# Patient Record
Sex: FEMALE | Race: WHITE | NOT HISPANIC OR LATINO | Employment: OTHER | URBAN - METROPOLITAN AREA
[De-identification: names, ages, dates, MRNs, and addresses within clinical notes are randomized per-mention and may not be internally consistent; named-entity substitution may affect disease eponyms.]

---

## 2020-07-08 ENCOUNTER — OFFICE VISIT - HEALTHEAST (OUTPATIENT)
Dept: INTERNAL MEDICINE | Facility: CLINIC | Age: 85
End: 2020-07-08

## 2020-07-08 DIAGNOSIS — M54.50 CHRONIC BILATERAL LOW BACK PAIN WITHOUT SCIATICA: ICD-10-CM

## 2020-07-08 DIAGNOSIS — G89.29 CHRONIC BILATERAL LOW BACK PAIN WITHOUT SCIATICA: ICD-10-CM

## 2020-07-08 DIAGNOSIS — M81.0 AGE-RELATED OSTEOPOROSIS WITHOUT CURRENT PATHOLOGICAL FRACTURE: ICD-10-CM

## 2020-07-08 DIAGNOSIS — Z76.89 ENCOUNTER TO ESTABLISH CARE: ICD-10-CM

## 2020-07-08 RX ORDER — NIACIN 250 MG
250 TABLET ORAL
Status: SHIPPED | COMMUNITY
Start: 2020-07-08

## 2020-07-08 RX ORDER — POLYETHYLENE GLYCOL 3350 17 G/17G
17 POWDER, FOR SOLUTION ORAL DAILY PRN
Status: SHIPPED | COMMUNITY
Start: 2020-07-08

## 2020-07-08 RX ORDER — ASCORBIC ACID 500 MG
1000 TABLET ORAL DAILY
Status: SHIPPED | COMMUNITY
Start: 2020-07-08

## 2020-07-08 RX ORDER — MECLIZINE HYDROCHLORIDE 25 MG/1
25 TABLET ORAL DAILY PRN
Status: SHIPPED | COMMUNITY
Start: 2020-07-08

## 2020-07-08 ASSESSMENT — MIFFLIN-ST. JEOR: SCORE: 921.9

## 2021-01-27 ENCOUNTER — AMBULATORY - HEALTHEAST (OUTPATIENT)
Dept: LAB | Facility: CLINIC | Age: 86
End: 2021-01-27

## 2021-01-27 ENCOUNTER — OFFICE VISIT - HEALTHEAST (OUTPATIENT)
Dept: INTERNAL MEDICINE | Facility: CLINIC | Age: 86
End: 2021-01-27

## 2021-01-27 DIAGNOSIS — G89.29 CHRONIC BILATERAL LOW BACK PAIN WITHOUT SCIATICA: ICD-10-CM

## 2021-01-27 DIAGNOSIS — M54.50 CHRONIC BILATERAL LOW BACK PAIN WITHOUT SCIATICA: ICD-10-CM

## 2021-01-27 DIAGNOSIS — H61.20 IMPACTED CERUMEN, UNSPECIFIED LATERALITY: ICD-10-CM

## 2021-01-27 DIAGNOSIS — Z00.00 ROUTINE GENERAL MEDICAL EXAMINATION AT A HEALTH CARE FACILITY: ICD-10-CM

## 2021-01-27 DIAGNOSIS — Z13.220 ENCOUNTER FOR SCREENING FOR LIPOID DISORDERS: ICD-10-CM

## 2021-01-27 DIAGNOSIS — M81.0 AGE-RELATED OSTEOPOROSIS WITHOUT CURRENT PATHOLOGICAL FRACTURE: ICD-10-CM

## 2021-01-27 DIAGNOSIS — T14.8XXA SKIN ABRASION: ICD-10-CM

## 2021-01-27 LAB
ALBUMIN SERPL-MCNC: 3.7 G/DL (ref 3.5–5)
ALP SERPL-CCNC: 108 U/L (ref 45–120)
ALT SERPL W P-5'-P-CCNC: 19 U/L (ref 0–45)
ANION GAP SERPL CALCULATED.3IONS-SCNC: 11 MMOL/L (ref 5–18)
AST SERPL W P-5'-P-CCNC: 25 U/L (ref 0–40)
BASOPHILS # BLD AUTO: 0.1 THOU/UL (ref 0–0.2)
BASOPHILS NFR BLD AUTO: 1 % (ref 0–2)
BILIRUB SERPL-MCNC: 0.6 MG/DL (ref 0–1)
BUN SERPL-MCNC: 16 MG/DL (ref 8–28)
CALCIUM SERPL-MCNC: 9.7 MG/DL (ref 8.5–10.5)
CHLORIDE BLD-SCNC: 102 MMOL/L (ref 98–107)
CHOLEST SERPL-MCNC: 290 MG/DL
CO2 SERPL-SCNC: 27 MMOL/L (ref 22–31)
CREAT SERPL-MCNC: 0.94 MG/DL (ref 0.6–1.1)
EOSINOPHIL # BLD AUTO: 0 THOU/UL (ref 0–0.4)
EOSINOPHIL NFR BLD AUTO: 1 % (ref 0–6)
ERYTHROCYTE [DISTWIDTH] IN BLOOD BY AUTOMATED COUNT: 13 % (ref 11–14.5)
FASTING STATUS PATIENT QL REPORTED: YES
GFR SERPL CREATININE-BSD FRML MDRD: 56 ML/MIN/1.73M2
GLUCOSE BLD-MCNC: 112 MG/DL (ref 70–125)
HCT VFR BLD AUTO: 43.4 % (ref 35–47)
HDLC SERPL-MCNC: 88 MG/DL
HGB BLD-MCNC: 14.5 G/DL (ref 12–16)
IMM GRANULOCYTES # BLD: 0 THOU/UL
IMM GRANULOCYTES NFR BLD: 0 %
LDLC SERPL CALC-MCNC: 188 MG/DL
LYMPHOCYTES # BLD AUTO: 1.7 THOU/UL (ref 0.8–4.4)
LYMPHOCYTES NFR BLD AUTO: 29 % (ref 20–40)
MCH RBC QN AUTO: 31.5 PG (ref 27–34)
MCHC RBC AUTO-ENTMCNC: 33.4 G/DL (ref 32–36)
MCV RBC AUTO: 94 FL (ref 80–100)
MONOCYTES # BLD AUTO: 0.5 THOU/UL (ref 0–0.9)
MONOCYTES NFR BLD AUTO: 9 % (ref 2–10)
NEUTROPHILS # BLD AUTO: 3.5 THOU/UL (ref 2–7.7)
NEUTROPHILS NFR BLD AUTO: 61 % (ref 50–70)
PLATELET # BLD AUTO: 287 THOU/UL (ref 140–440)
PMV BLD AUTO: 10.3 FL (ref 8.5–12.5)
POTASSIUM BLD-SCNC: 4.3 MMOL/L (ref 3.5–5)
PROT SERPL-MCNC: 7 G/DL (ref 6–8)
RBC # BLD AUTO: 4.6 MILL/UL (ref 3.8–5.4)
SODIUM SERPL-SCNC: 140 MMOL/L (ref 136–145)
TRIGL SERPL-MCNC: 72 MG/DL
TSH SERPL DL<=0.005 MIU/L-ACNC: 1.43 UIU/ML (ref 0.3–5)
WBC: 5.8 THOU/UL (ref 4–11)

## 2021-01-27 ASSESSMENT — MIFFLIN-ST. JEOR: SCORE: 926.43

## 2021-01-29 ENCOUNTER — COMMUNICATION - HEALTHEAST (OUTPATIENT)
Dept: INTERNAL MEDICINE | Facility: CLINIC | Age: 86
End: 2021-01-29

## 2021-02-16 ENCOUNTER — AMBULATORY - HEALTHEAST (OUTPATIENT)
Dept: NURSING | Facility: CLINIC | Age: 86
End: 2021-02-16

## 2021-03-09 ENCOUNTER — AMBULATORY - HEALTHEAST (OUTPATIENT)
Dept: NURSING | Facility: CLINIC | Age: 86
End: 2021-03-09

## 2021-06-04 VITALS
RESPIRATION RATE: 16 BRPM | HEART RATE: 82 BPM | WEIGHT: 117 LBS | TEMPERATURE: 97.9 F | BODY MASS INDEX: 20.73 KG/M2 | SYSTOLIC BLOOD PRESSURE: 138 MMHG | HEIGHT: 63 IN | OXYGEN SATURATION: 93 % | DIASTOLIC BLOOD PRESSURE: 86 MMHG

## 2021-06-05 VITALS
TEMPERATURE: 98.2 F | DIASTOLIC BLOOD PRESSURE: 78 MMHG | SYSTOLIC BLOOD PRESSURE: 138 MMHG | RESPIRATION RATE: 20 BRPM | HEIGHT: 63 IN | BODY MASS INDEX: 20.91 KG/M2 | OXYGEN SATURATION: 94 % | WEIGHT: 118 LBS | HEART RATE: 101 BPM

## 2021-06-09 NOTE — PROGRESS NOTES
Internal Medicine Office Visit  [facility]  Patient Name: Ambar Dutton  Patient Age: 87 y.o.  YOB: 1932  MRN: [id number]    Date of Visit: 2020  Reason for Office Visit:   Chief Complaint   Patient presents with     Establish Care           Assessment / Plan / Medical Decision Makin. Encounter to establish care  2. Age-related osteoporosis without current pathological fracture  - DXA Bone Density Scan; Future  - DXA Bone Density Scan  Discussion was had with patient about weightbearing physical activity.  She feels that her balance is good she is getting adequate physical activity discussed with patient also that if her bone density indicates osteoporosis will recommend referral to the osteoporosis clinic for further discussion of treatment options given patient's age and health status    3. Chronic bilateral low back pain without sciatica  Recommend utilization over-the-counter Tylenol, patient certainly may trial some heat or ice at 20-minute increments discussion was had with patient about possibility of using lidocaine patches.  Did advise patient if her symptoms persist or worsen would recommend follow-up at that time and certainly we could place a referral to physical therapy    Orders Placed This Encounter   Procedures     DXA Bone Density Scan   Followup: Return in about 6 months (around 2021). earlier if needed.    Health Maintenance Review  Health Maintenance   Topic Date Due     TD 18+ HE  1950     ADVANCE CARE PLANNING  1950     MEDICARE ANNUAL WELLNESS VISIT  1997     PNEUMOCOCCAL IMMUNIZATION 65+ LOW/MEDIUM RISK (1 of 2 - PCV13) 1997     DXA SCAN  1997     FALL RISK ASSESSMENT  1997     ZOSTER VACCINES (1 of 2) 2012     INFLUENZA VACCINE RULE BASED (1) 2020         I am having Ambar Dutton maintain her ascorbic acid (vitamin C), aspirin-calcium carbonate, calcium carbonate-vitamin  "D3, docosahexaenoic acid-epa, meclizine, niacin, and polyethylene glycol.      HPI:  Ambar Dutton is a 87 y.o. year old who presents to the office today with chronic conditions including low back pain, osteoporosis, constipation.    Patient reports a history of bronchitis annually and typically takes a zpak.  She is seen annual and is not on symone medications.     Back surgery 8 years ago.  She reports \"tightness\" in the back in the late day. She is on her feet during the day and takes care of her home. She is walking daily. She is doing back exercises daily.      Patient reports that she is feeling very well and she enjoys staying active.  She reports some sadness over the loss of her  8 months ago though feels overall she is doing well.  She discusses what a great support system she has with her 4 children    Review of Systems- pertinent positive in bold:  Constitutional: Fever, chills, night sweats, fainting, weight change, fatigue, dizziness, sleeping difficulties, loud snoring/pauses in breathing  Eyes: change in vision, blurred or double vision, redness/eye pain  Ears, nose, mouth, throat: change in hearing, ear pain, hoarseness, difficulty swallowing, sores in the mouth or throat  Respiratory: shortness of breath, cough, bloody sputum, wheezing  Cardiovascular: chest pain, palpitations   Gastrointestinal: abdominal pain, heartburn/indigestion, nausea/vomiting, change in appetite, change in bowel habits, constipation or diarrhea, rectal bleeding/dark stools, difficulty swallowing  Urinary: painful urination, frequent urination, urinary urgency/incontinence, blood in urine/dark urine, nocturia (new or increasing), muscle cramps, swelling of hands, feet, ankles, leg pain/redness  Skin: change in moles/freckles, rash, nodules  Hematologic/lymphatic: swollen lymph glands, abnormal bruising/bleeding  Endocrine: excessive thirst/urination, cold or heat intolerance  Neurologic/emotional: worrisome memory " "change, numbness/tingling, anxiety, mood swings      Current Scheduled Meds:  Outpatient Encounter Medications as of 7/8/2020   Medication Sig Dispense Refill     ascorbic acid, vitamin C, (VITAMIN C) 500 MG tablet Take 1,000 mg by mouth.       aspirin-calcium carbonate 81 mg-300 mg calcium(777 mg) Tab Take 81 mg by mouth.       calcium carbonate-vitamin D3 600 mg(1,500mg) -200 unit per tablet Take 1 tablet by mouth.       docosahexaenoic acid-epa 120-180 mg cap Take 1 g by mouth.       meclizine (ANTIVERT) 25 mg tablet Take 25 mg by mouth.       niacin 250 MG tablet Take 250 mg by mouth daily with breakfast.       polyethylene glycol (MIRALAX) 17 gram packet Take 17 g by mouth daily.       No facility-administered encounter medications on file as of 7/8/2020.      No past medical history on file.  No past surgical history on file.  Social History     Tobacco Use     Smoking status: Never Smoker     Smokeless tobacco: Never Used   Substance Use Topics     Alcohol use: Not Currently     Drug use: Not Currently     No family history on file.  Social History     Social History Narrative    Widowedx2 lost her  8 months ago (7.20).  She has 4 children who live in [place]. She enjoys golfing.        Objective / Physical Examination:  Vitals:    07/08/20 0947   BP: 138/86   Pulse: 82   Resp: 16   Temp: 97.9  F (36.6  C)   SpO2: 93%   Weight: 117 lb (53.1 kg)   Height: 5' 2.5\" (1.588 m)     Wt Readings from Last 3 Encounters:   07/08/20 117 lb (53.1 kg)     Body mass index is 21.06 kg/m .     General Appearance: Alert and oriented, cooperative, affect appropriate, speech clear, in no apparent distress  Head: Normocephalic, atraumatic  Eyes: Conjunctivae clear and sclerae non-icteric  Neck: Supple, trachea midline. No cervical adenopathy  Lungs: Clear to auscultation bilaterally. No adventitious lung sounds noted. Normal inspiratory and expiratory effort  Cardiovascular: Regular rate, normal S1, S2. No murmurs, " rubs, or gallops  Back: Assist in seated position mild kyphosis she has some low back discomfort in the SI joints she is good range of motion strength is equal throughout  Extremities: Pulses 2+ and equal throughout. No edema. Strength equal throughout.  Integumentary: Warm and dry. Without suspicious looking lesions  Neuro: Alert and oriented, follows commands appropriately.     No results found.  No results found for this or any previous visit (from the past 240 hour(s)).        Mary Barnes, CNP

## 2021-06-14 NOTE — PROGRESS NOTES
Assessment and Plan:   1. Routine general medical examination at a health care facility  - Comprehensive Metabolic Panel  - 1(CBC and Differential)  - Thyroid [place]    2. Encounter for screening for lipoid disorders  - Lipid [place], FASTING; Future    3. Age-related osteoporosis without current pathological fracture  4. Chronic bilateral low back pain without sciatica  5. Impacted cerumen, unspecified laterality   Chronic conditions remain stable, recommend no medication changes at this time.      The patient's current medical problems were reviewed.      The following health maintenance schedule was reviewed with the patient and provided in printed form in the after visit summary:   Health Maintenance   Topic Date Due     ADVANCE CARE PLANNING  08/30/1950     ZOSTER VACCINES (2 of 3) 12/05/2012     MEDICARE ANNUAL WELLNESS VISIT  01/27/2022     FALL RISK ASSESSMENT  01/27/2022     TD 18+ HE  01/07/2024     Pneumococcal Vaccine: 65+ Years  Completed     INFLUENZA VACCINE RULE BASED  Completed     Pneumococcal Vaccine: Pediatrics (0 to 5 Years) and At-Risk Patients (6 to 64 Years)  Aged Out     HEPATITIS B VACCINES  Aged Out        Subjective:   Chief Complaint: Ambar Dutton is an 88 y.o. female here for an Annual Wellness visit.   HPI:  chronic conditions including low back pain, osteoporosis, constipation.  Patient reports she is doing well denying any concerns.    Review of Systems:    Please see above.  The rest of the review of systems are negative for all systems.    Patient Care Team:  Mary Barnes CNP as PCP - General (Nurse Practitioner)  Mary Barnes CNP as Assigned PCP     Patient Active Problem List   Diagnosis     Other specified counseling     Anxiety disorder     Chest pain     Hypercholesteremia     Low back pain     Osteoporosis     Spinal stenosis of lumbar region     Stress-induced cardiomyopathy     No past medical history on file.   No past surgical history on  file.   No family history on file.   Social History     Socioeconomic History     Marital status:      Spouse name: Not on file     Number of children: Not on file     Years of education: Not on file     Highest education level: Not on file   Occupational History     Not on file   Social Needs     Financial resource strain: Not on file     Food insecurity     Worry: Not on file     Inability: Not on file     Transportation needs     Medical: Not on file     Non-medical: Not on file   Tobacco Use     Smoking status: Never Smoker     Smokeless tobacco: Never Used   Substance and Sexual Activity     Alcohol use: Not Currently     Drug use: Not Currently     Sexual activity: Not Currently   Lifestyle     Physical activity     Days per week: Not on file     Minutes per session: Not on file     Stress: Not on file   Relationships     Social connections     Talks on phone: Not on file     Gets together: Not on file     Attends Jewish service: Not on file     Active member of club or organization: Not on file     Attends meetings of clubs or organizations: Not on file     Relationship status: Not on file     Intimate partner violence     Fear of current or ex partner: Not on file     Emotionally abused: Not on file     Physically abused: Not on file     Forced sexual activity: Not on file   Other Topics Concern     Not on file   Social History Narrative    Widowedx2 lost her  8 months ago (7.20).  She has 4 children who live in [place]. She enjoys golfing.       Current Outpatient Medications   Medication Sig Dispense Refill     ascorbic acid, vitamin C, (VITAMIN C) 500 MG tablet Take 1,000 mg by mouth.       aspirin-calcium carbonate 81 mg-300 mg calcium(777 mg) Tab Take 81 mg by mouth.       calcium carbonate-vitamin D3 600 mg(1,500mg) -200 unit per tablet Take 1 tablet by mouth.       docosahexaenoic acid-epa 120-180 mg cap Take 1 g by mouth.       meclizine (ANTIVERT) 25 mg tablet Take 25 mg by  "mouth.       niacin 250 MG tablet Take 250 mg by mouth daily with breakfast.       polyethylene glycol (MIRALAX) 17 gram packet Take 17 g by mouth daily.       No current facility-administered medications for this visit.       Objective:   Vital Signs:   Visit Vitals  /72   Pulse (!) 106   Temp 98.2  F (36.8  C)   Resp 20   Ht 5' 2.5\" (1.588 m)   Wt 118 lb (53.5 kg)   SpO2 94%   BMI 21.24 kg/m           VisionScreening:  No exam data present     PHYSICAL EXAM  /72   Pulse (!) 106   Temp 98.2  F (36.8  C)   Resp 20   Ht 5' 2.5\" (1.588 m)   Wt 118 lb (53.5 kg)   SpO2 94%   BMI 21.24 kg/m      General Appearance:    Alert, cooperative, no distress, appears stated age   Head:    Normocephalic, without obvious abnormality, atraumatic   Eyes:    PERRL, conjunctiva/corneas clear   Ears:   Cerumen noted bilaterally   Nose:   Nares normal, septum midline, mucosa normal, no drainage     or sinus tenderness   Throat:   Lips, mucosa, and tongue normal; teeth and gums normal   Neck:   Supple, symmetrical, trachea midline, no adenopathy;     thyroid:  no enlargement/tenderness/nodules; no carotid    bruit or JVD   Back:     Symmetric, mild kyphosis, ROM normal, no CVA tenderness   Lungs:     Clear to auscultation bilaterally, respirations unlabored   Chest Wall:    No tenderness or deformity    Heart:    Regular rate and rhythm, S1 and S2 normal, no murmur, rub    or gallop   Breast Exam:    No tenderness, masses, or nipple abnormality   Abdomen:     Soft, non-tender, bowel sounds active all four quadrants,     no masses, no organomegaly   Extremities:   Extremities normal, atraumatic, no cyanosis or edema   Pulses:   2+ and symmetric all extremities   Skin:   Skin color, texture, turgor normal, no rashes or lesions   Lymph nodes:   Cervical, supraclavicular, and axillary nodes normal   Neurologic:   CNII-XII intact, normal strength, sensation and reflexes     throughout         Assessment Results 1/27/2021 "   Activities of Daily Living No help needed   Instrumental Activities of Daily Living No help needed   Mini Cog Total Score 4   Some recent data might be hidden     A Mini-Cog score of 0-2 suggests the possibility of dementia, score of 3-5 suggests no dementia    Identified Health Risks:     The patient was counseled and encouraged to consider modifying their diet and eating habits. She was provided with information on recommended healthy diet options.  Patient's advanced directive was discussed and I am comfortable with the patient's wishes.

## 2021-06-15 ENCOUNTER — OFFICE VISIT - HEALTHEAST (OUTPATIENT)
Dept: INTERNAL MEDICINE | Facility: CLINIC | Age: 86
End: 2021-06-15

## 2021-06-15 ENCOUNTER — COMMUNICATION - HEALTHEAST (OUTPATIENT)
Dept: INTERNAL MEDICINE | Facility: CLINIC | Age: 86
End: 2021-06-15

## 2021-06-15 DIAGNOSIS — M54.41 ACUTE RIGHT-SIDED LOW BACK PAIN WITH RIGHT-SIDED SCIATICA: ICD-10-CM

## 2021-06-15 DIAGNOSIS — M54.41 LUMBAGO WITH SCIATICA, RIGHT SIDE: ICD-10-CM

## 2021-06-15 RX ORDER — PREDNISONE 20 MG/1
TABLET ORAL
Qty: 9 TABLET | Status: SHIPPED | OUTPATIENT
Start: 2021-06-15 | End: 2021-06-21

## 2021-06-15 RX ORDER — PREDNISONE 20 MG/1
20 TABLET ORAL DAILY
Refills: 0 | Status: SHIPPED | OUTPATIENT
Start: 2021-06-18 | End: 2021-06-20

## 2021-06-15 RX ORDER — PREDNISONE 20 MG/1
40 TABLET ORAL DAILY
Refills: 0 | Status: SHIPPED | OUTPATIENT
Start: 2021-06-15 | End: 2021-06-17

## 2021-06-16 PROBLEM — M48.061 SPINAL STENOSIS OF LUMBAR REGION: Status: ACTIVE | Noted: 2020-07-08

## 2021-06-16 PROBLEM — E78.00 HYPERCHOLESTEREMIA: Status: ACTIVE | Noted: 2020-07-08

## 2021-06-16 PROBLEM — I51.81 STRESS-INDUCED CARDIOMYOPATHY: Status: ACTIVE | Noted: 2020-07-08

## 2021-06-16 PROBLEM — F41.9 ANXIETY DISORDER: Status: ACTIVE | Noted: 2020-07-08

## 2021-06-16 PROBLEM — M81.0 OSTEOPOROSIS: Status: ACTIVE | Noted: 2020-07-08

## 2021-06-18 NOTE — PATIENT INSTRUCTIONS - HE
Patient Instructions by Mary Barnes CNP at 1/27/2021  8:40 AM     Author: Mary Barnes CNP Service: -- Author Type: Nurse Practitioner    Filed: 1/27/2021  8:51 AM Encounter Date: 1/27/2021 Status: Signed    : Mary Barnes CNP (Nurse Practitioner)         Patient Education   Understanding USDA MyPlate  The USDA (US Department of Agriculture) has guidelines to help you make healthy food choices. These are called MyPlate. MyPlate shows the food groups that make up healthy meals using the image of a place setting. Before you eat, think about the healthiest choices for what to put onto your plate or into your cup or bowl. To learn more about building a healthy plate, visit www.choosemyplate.gov.       The Food Groups    Fruits: Any fruit or 100% fruit juice counts as part of the Fruit Group. Fruits may be fresh, canned, frozen, or dried, and may be whole, cut-up, or pureed. Make half your plate fruits and vegetables.    Vegetables: Any vegetable or 100% vegetable juice counts as a member of the Vegetable Group. Vegetables may be fresh, frozen, canned, or dried. They can be served raw or cooked and may be whole, cut-up, or mashed. Make half your plate fruits and vegetables.     Grains: All foods made from grains are part of the Grains Group. These include wheat, rice, oats, cornmeal, and barley such as bread, pasta, oatmeal, cereal, tortillas, and grits. Grains should be no more than a quarter of your plate. At least half of your grains should be whole grains.    Protein: This group includes meat, poultry, seafood, beans and peas, eggs, processed soy products (like tofu), nuts (including nut butters), and seeds. Make protein choices no more than a quarter of your plate. Meat and poultry choices should be lean or low fat.    Dairy: All fluid milk products and foods made from milk that contain calcium, like yogurt and cheese are part of the Dairy Group. (Foods that have little  calcium, such as cream, butter, and cream cheese, are not part of the group.) Most dairy choices should be low-fat or fat-free.    Oils: These are fats that are liquid at room temperature. They include canola, corn, olive, soybean, and sunflower oil. Foods that are mainly oil include mayonnaise, certain salad dressings, and soft margarines. You should have only 5 to 7 teaspoons of oils a day. You probably already get this much from the food you eat.  Use [employer] to Help Build Your Meals  The [employer] can help you plan and track your meals and activity. You can look up individual foods to see or compare their nutritional value. You can get guidelines for what and how much you should eat. You can compare your food choices. And you can assess personal physical activities and see ways you can improve. Go to www.nanoTherics.gov/[place]/.    9325-8808 [employer]. [address] [place] [address]. All rights reserved. This information is not intended as a substitute for professional medical care. Always follow your healthcare professional's instructions.             Advance Directive  Patients advance directive was discussed and I am comfortable with the patients wishes.  Patient Education   Personalized Prevention Plan  You are due for the preventive services outlined below.  Your care team is available to assist you in scheduling these services.  If you have already completed any of these items, please share that information with your care team to update in your medical record.  Health Maintenance   Topic Date Due   ? ADVANCE CARE PLANNING  08/30/1950   ? ZOSTER VACCINES (2 of 3) 12/05/2012   ? MEDICARE ANNUAL WELLNESS VISIT  01/27/2022   ? FALL RISK ASSESSMENT  01/27/2022   ? TD 18+ HE  01/07/2024   ? Pneumococcal Vaccine: 65+ Years  Completed   ? INFLUENZA VACCINE RULE BASED  Completed   ? Pneumococcal Vaccine: Pediatrics (0 to 5 Years) and At-Risk Patients (6 to 64 Years)  Aged  Out   ? HEPATITIS B VACCINES  Aged Out

## 2021-06-21 NOTE — LETTER
Letter by Mary Barnes CNP at      Author: Mary Barnes CNP Service: -- Author Type: --    Filed:  Encounter Date: 1/29/2021 Status: (Other)         Ambar Dutton  [address]  [address]  [place] [address]             January 29, 2021         Dear Ms. Dutton,    Below are the results from your recent visit:    Resulted Orders   Lipid [place], FASTING   Result Value Ref Range    Cholesterol 290 (H) <=199 mg/dL    Triglycerides 72 <=149 mg/dL    HDL Cholesterol 88 >=50 mg/dL    LDL Calculated 188 (H) <=129 mg/dL    Patient Fasting > 8hrs? Yes         I would recommend taking a statin medication to lower your cholesterol.  If you are in agreement please  let my  office know any prescription will be sent to your pharmacy.     Please call with questions or contact us using [employer].    Sincerely,        Electronically signed by Mary Barnes CNP

## 2021-06-21 NOTE — LETTER
Letter by Mary Barnes CNP at      Author: Mary Barnes CNP Service: -- Author Type: --    Filed:  Encounter Date: 1/29/2021 Status: (Other)         Ambar Dutton  [address]  [address]  [place] [address]             January 29, 2021         Dear Ms. Dutton,    Below are the results from your recent visit:    Resulted Orders   Comprehensive Metabolic Panel   Result Value Ref Range    Sodium 140 136 - 145 mmol/L    Potassium 4.3 3.5 - 5.0 mmol/L    Chloride 102 98 - 107 mmol/L    CO2 27 22 - 31 mmol/L    Anion Gap, Calculation 11 5 - 18 mmol/L    Glucose 112 70 - 125 mg/dL    BUN 16 8 - 28 mg/dL    Creatinine 0.94 0.60 - 1.10 mg/dL    GFR MDRD Af Amer >60 >60 mL/min/1.73m2    GFR MDRD Non Af Amer 56 (L) >60 mL/min/1.73m2    Bilirubin, Total 0.6 0.0 - 1.0 mg/dL    Calcium 9.7 8.5 - 10.5 mg/dL    Protein, Total 7.0 6.0 - 8.0 g/dL    Albumin 3.7 3.5 - 5.0 g/dL    Alkaline Phosphatase 108 45 - 120 U/L    AST 25 0 - 40 U/L    ALT 19 0 - 45 U/L    Narrative    Fasting Glucose reference range is 70-99 mg/dL per  American Diabetes Association (ADA) guidelines.   Thyroid [place]   Result Value Ref Range    TSH 1.43 0.30 - 5.00 uIU/mL   HM1 (CBC with Diff)   Result Value Ref Range    WBC 5.8 4.0 - 11.0 thou/uL    RBC 4.60 3.80 - 5.40 mill/uL    Hemoglobin 14.5 12.0 - 16.0 g/dL    Hematocrit 43.4 35.0 - 47.0 %    MCV 94 80 - 100 fL    MCH 31.5 27.0 - 34.0 pg    MCHC 33.4 32.0 - 36.0 g/dL    RDW 13.0 11.0 - 14.5 %    Platelets 287 140 - 440 thou/uL    MPV 10.3 8.5 - 12.5 fL    Neutrophils % 61 50 - 70 %    Lymphocytes % 29 20 - 40 %    Monocytes % 9 2 - 10 %    Eosinophils % 1 0 - 6 %    Basophils % 1 0 - 2 %    Immature Granulocyte % 0 <=0 %    Neutrophils Absolute 3.5 2.0 - 7.7 thou/uL    Lymphocytes Absolute 1.7 0.8 - 4.4 thou/uL    Monocytes Absolute 0.5 0.0 - 0.9 thou/uL    Eosinophils Absolute 0.0 0.0 - 0.4 thou/uL    Basophils Absolute 0.1 0.0 - 0.2 thou/uL    Immature Granulocyte Absolute  0.0 <=0.0 thou/uL       Please see results     Please call with questions or contact us using [employer].    Sincerely,        Electronically signed by Mary Barnes CNP

## 2021-06-26 NOTE — PATIENT INSTRUCTIONS - HE
Please follow up if you have any further issues.    You may contact me by phone or MyChart if you are worsening or if things are not improving.    ______________________________________________________________________     Please remember that you can call [phone] to schedule an appointment.     You can schedule appointments 24 hours a day, 7 days a week.  Sometimes the best time to schedule an appointment is after clinic hours when less people are calling in.  Weekends are another option for calling in to schedule appointments.

## 2021-07-04 NOTE — PROGRESS NOTES
Progress Notes by Sadiq Mckeon MD at 6/15/2021  9:55 AM     Author: Sadiq Mckeon MD Service: -- Author Type: Physician    Filed: 6/17/2021  9:20 AM Encounter Date: 6/15/2021 Status: Signed    : Sadiq Mckeon MD (Physician)            [facility]  Dr. Sadiq Mckeon             Date of Service: 6/15/2021  Primary Provider: Mary Barnes CNP    Patient Care Team:  Mary Barnes CNP as PCP - General (Nurse Practitioner)  Mary Barnes CNP as Assigned PCP     ______________________________________________________________________     Patient's Pharmacy:    [facility] [address] [place] [address] [address] [facility]  [address] [facility] [address]  Phone: [phone] Fax: [phone]     Patient's Contacts:  Name Home Phone Work Phone Mobile Phone Relationship Lg LATISHA Tolentino   [phone] Child        Patient's Insurance:    Payor: MEDICARE / Plan: MEDICARE A AND B / Product Type: Medicare /            Ambar VERONIQUE Dutton is a 88 y.o. female who comes in today for:    Chief Complaint   Patient presents with   ? Back Pain     woke up with the pain almost 2 weeks ago that also goes down to her Rt Leg as well, has been trying to do exercises to help       Active Problem List:  Problem List as of 6/15/2021 Reviewed: 1/27/2021  9:03 AM by Mary Barnes CNP       Unprioritized    Anxiety disorder    Chest pain    Hypercholesteremia    Low back pain    Osteoporosis    Other specified counseling    Spinal stenosis of lumbar region    Stress-induced cardiomyopathy           Current Outpatient Medications   Medication Sig   ? ascorbic acid, vitamin C, (VITAMIN C) 500 MG tablet Take 1,000 mg by mouth.   ? aspirin-calcium carbonate 81 mg-300 mg calcium(777 mg) Tab Take 81 mg by mouth.   ? calcium carbonate-vitamin D3 600 mg(1,500mg) -200 unit per tablet Take 1 tablet by mouth.   ? docosahexaenoic acid-epa  120-180 mg cap Take 1 g by mouth.   ? meclizine (ANTIVERT) 25 mg tablet Take 25 mg by mouth.   ? niacin 250 MG tablet Take 250 mg by mouth daily with breakfast.   ? polyethylene glycol (MIRALAX) 17 gram packet Take 17 g by mouth daily.   ? predniSONE (DELTASONE) 20 MG tablet Take 40 mg by mouth daily for 3 days, THEN 20 mg daily for 3 days.     Social History     Social History Narrative    Widowedx2 lost her  8 months ago (7.20).  She has 4 children who live in [place]. She enjoys golfing.         Former patient of Dr. Ian Ornelas.        Lives at [facility].       Subjective:     Patient comes in today for evaluation of back pain.  This has been going on for about 1-2 weeks, since last Thursday, Lizette 3.  She has a history of lumbar stenosis with decompression by Dr. Vaughn Castillo in 2013.    We reviewed her pain with this.  Is usually in her back especially on the right side and it can go into the back of the right leg with some burning discomfort down the back of the leg.  It is usually worse with standing up especially when she stands up at the sink.  She denies any new weakness with this.    It is particularly bad in the morning but she sleeps okay at night.  She sleeps 6 to 7 hours without issues.  When it bothers her, she sits down it seems to help.  She is using Tylenol and Aleve for her pain which helps somewhat.    We do not have any records on file from [facility] orthopedics, so we would like to get this.  Her MRI from [facility] in 2013 was reviewed.    She does have known osteoporosis and vertebral fractures in the past.  This was also touched on with her today.    We reviewed her other issues noted in the assessment but not specifically addressed in the HPI above.     Objective:     Wt Readings from Last 3 Encounters:   06/15/21 118 lb (53.5 kg)   01/27/21 118 lb (53.5 kg)   07/08/20 117 lb (53.1 kg)     BP Readings from Last 3 Encounters:   06/15/21 134/82   01/27/21 138/78    07/08/20 138/86     /82   Pulse (!) 56   Wt 118 lb (53.5 kg)   SpO2 96%   BMI 21.24 kg/m     The patient is comfortable, no acute distress.  Mood good.  Insight good.  Eyes are nonicteric.  Neck is supple without mass.  No cervical adenopathy.  No thyromegaly. Heart regular rate and rhythm.  Lungs clear to auscultation bilaterally.  Respiratory effort is good.  Extremities no edema.  Her right back shows some muscle spasm.  Her straight leg raises are negative.  Her gait is overall good.  There is no trochanteric bursitis.  Her Ananth's maneuver on the right is good.      Diagnostics:     Results for orders placed or performed in visit on 01/27/21   Lipid Bladen, FASTING   Result Value Ref Range    Cholesterol 290 (H) <=199 mg/dL    Triglycerides 72 <=149 mg/dL    HDL Cholesterol 88 >=50 mg/dL    LDL Calculated 188 (H) <=129 mg/dL    Patient Fasting > 8hrs? Yes        ______________________________________________________________________    Pertinent radiology for this visit includes the following:    XR Lumbar Spine 2 or 3 VWS  EXAM DATE:         06/15/2021    EXAM: X-RAY LUMBAR SPINE, AP AND LATERAL  LOCATION: [facility]  DATE/TIME: 6/15/2021 11:15 AM    INDICATION: Lumbar back pain  COMPARISON: MRI 03/03/2013.  TECHNIQUE: CR Lumbar Spine.    IMPRESSION:    Nomenclature presumes 5 lumbar type vertebral bodies.    Compression fracture deformities of the L4 and L5 vertebral bodies are chronic and appear grossly unchanged from 03/03/2013. Mild compression fracture deformities of the L3 and L2 vertebral bodies are new from 03/03/2013 but age-indeterminate. Consider   cross-sectional imaging for further evaluation, as clinically appropriate. Diffuse osteopenia reduces sensitivity for fracture.    There is mild L4-L5 anterolisthesis and mild retrolisthesis. There is broad-based dextroconvex curvature.    There is advanced lower lumbar facet arthropathy. There is mild to  moderate multilevel spondylosis.    Pedicles appear symmetric.    The imaged portion of the sacrum appears grossly intact. However, it is partially obscured by stool and bowel gas.      ______________________________________________________________________      Assessment:     1. Acute right-sided low back pain with right-sided sciatica        Quality review:     PHQ-2 Total Score: 0 (1/27/2021  8:00 AM)  Depression Follow-up Plan: patient follow-up to return when and if necessary (1/27/2021  8:00 AM)    No data recorded  ______________________________________________________________________     BMI Readings from Last 1 Encounters:   06/15/21 21.24 kg/m        Plan:     1. We are going to try a course of steroids to see if this helps.  2. Continue Tylenol and Aleve.  3. X-ray of the back done today.  4. If not improving, may need to consider MRI of the back to investigate further.  5. Request records from [facility] orthopedics.    30 minutes or greater was spent today on the patient's care on the day of service.      This includes time for chart preparation, reviewing medical tests done before or during the visit, talking with the patient, review of quality indicators, required documentation, and other elements of care.        Sadiq Mckeon MD  General Internal Medicine  [facility]      Return in about 7 months (around 1/28/2022), or if symptoms worsen or fail to improve, for follow up with your primary care provider.     Future Appointments   Date Time Provider Department Center   1/28/2022  9:00 AM [facility]         ______________________________________________________________________     Relevant ICD-10 codes and order associations:      ICD-10-CM    1. Acute right-sided low back pain with right-sided sciatica  M54.41 XR Lumbar Spine 2 or 3 VWS     XR Lumbar Spine 2 or 3 VWS     predniSONE (DELTASONE) 20 MG tablet

## 2021-07-06 ENCOUNTER — RECORDS - HEALTHEAST (OUTPATIENT)
Dept: ADMINISTRATIVE | Facility: OTHER | Age: 86
End: 2021-07-06

## 2021-07-06 VITALS
WEIGHT: 118 LBS | SYSTOLIC BLOOD PRESSURE: 134 MMHG | BODY MASS INDEX: 21.24 KG/M2 | OXYGEN SATURATION: 96 % | DIASTOLIC BLOOD PRESSURE: 82 MMHG | HEART RATE: 56 BPM

## 2021-10-17 ENCOUNTER — HEALTH MAINTENANCE LETTER (OUTPATIENT)
Age: 86
End: 2021-10-17

## 2022-01-28 ENCOUNTER — OFFICE VISIT (OUTPATIENT)
Dept: INTERNAL MEDICINE | Facility: CLINIC | Age: 87
End: 2022-01-28
Payer: MEDICARE

## 2022-01-28 VITALS
RESPIRATION RATE: 20 BRPM | HEIGHT: 61 IN | SYSTOLIC BLOOD PRESSURE: 132 MMHG | DIASTOLIC BLOOD PRESSURE: 68 MMHG | OXYGEN SATURATION: 97 % | WEIGHT: 115.2 LBS | HEART RATE: 96 BPM | BODY MASS INDEX: 21.75 KG/M2 | TEMPERATURE: 98.1 F

## 2022-01-28 DIAGNOSIS — M54.41 LOW BACK PAIN WITH RIGHT-SIDED SCIATICA, UNSPECIFIED BACK PAIN LATERALITY, UNSPECIFIED CHRONICITY: ICD-10-CM

## 2022-01-28 DIAGNOSIS — M81.0 AGE-RELATED OSTEOPOROSIS WITHOUT CURRENT PATHOLOGICAL FRACTURE: ICD-10-CM

## 2022-01-28 DIAGNOSIS — Z00.00 ENCOUNTER FOR MEDICARE ANNUAL WELLNESS EXAM: ICD-10-CM

## 2022-01-28 DIAGNOSIS — Z00.00 ENCOUNTER FOR MEDICARE ANNUAL WELLNESS EXAM: Primary | ICD-10-CM

## 2022-01-28 LAB
ALBUMIN UR-MCNC: ABNORMAL MG/DL
ANION GAP SERPL CALCULATED.3IONS-SCNC: 12 MMOL/L (ref 5–18)
APPEARANCE UR: CLEAR
BACTERIA #/AREA URNS HPF: ABNORMAL /HPF
BASOPHILS # BLD AUTO: 0.1 10E3/UL (ref 0–0.2)
BASOPHILS NFR BLD AUTO: 1 %
BILIRUB UR QL STRIP: NEGATIVE
BUN SERPL-MCNC: 21 MG/DL (ref 8–28)
CALCIUM SERPL-MCNC: 9.9 MG/DL (ref 8.5–10.5)
CHLORIDE BLD-SCNC: 103 MMOL/L (ref 98–107)
CO2 SERPL-SCNC: 25 MMOL/L (ref 22–31)
COLOR UR AUTO: YELLOW
CREAT SERPL-MCNC: 0.85 MG/DL (ref 0.6–1.1)
EOSINOPHIL # BLD AUTO: 0.1 10E3/UL (ref 0–0.7)
EOSINOPHIL NFR BLD AUTO: 1 %
ERYTHROCYTE [DISTWIDTH] IN BLOOD BY AUTOMATED COUNT: 13 % (ref 10–15)
GFR SERPL CREATININE-BSD FRML MDRD: 65 ML/MIN/1.73M2
GLUCOSE BLD-MCNC: 95 MG/DL (ref 70–125)
GLUCOSE UR STRIP-MCNC: NEGATIVE MG/DL
HCT VFR BLD AUTO: 42.8 % (ref 35–47)
HGB BLD-MCNC: 13.5 G/DL (ref 11.7–15.7)
HGB UR QL STRIP: NEGATIVE
IMM GRANULOCYTES # BLD: 0 10E3/UL
IMM GRANULOCYTES NFR BLD: 0 %
KETONES UR STRIP-MCNC: 15 MG/DL
LEUKOCYTE ESTERASE UR QL STRIP: ABNORMAL
LYMPHOCYTES # BLD AUTO: 1.5 10E3/UL (ref 0.8–5.3)
LYMPHOCYTES NFR BLD AUTO: 27 %
MCH RBC QN AUTO: 30.1 PG (ref 26.5–33)
MCHC RBC AUTO-ENTMCNC: 31.5 G/DL (ref 31.5–36.5)
MCV RBC AUTO: 95 FL (ref 78–100)
MONOCYTES # BLD AUTO: 0.6 10E3/UL (ref 0–1.3)
MONOCYTES NFR BLD AUTO: 10 %
MUCOUS THREADS #/AREA URNS LPF: PRESENT /LPF
NEUTROPHILS # BLD AUTO: 3.4 10E3/UL (ref 1.6–8.3)
NEUTROPHILS NFR BLD AUTO: 61 %
NITRATE UR QL: NEGATIVE
PH UR STRIP: 6.5 [PH] (ref 5–8)
PLATELET # BLD AUTO: 273 10E3/UL (ref 150–450)
POTASSIUM BLD-SCNC: 4.4 MMOL/L (ref 3.5–5)
RBC # BLD AUTO: 4.49 10E6/UL (ref 3.8–5.2)
RBC #/AREA URNS AUTO: ABNORMAL /HPF
SODIUM SERPL-SCNC: 140 MMOL/L (ref 136–145)
SP GR UR STRIP: 1.02 (ref 1–1.03)
SQUAMOUS #/AREA URNS AUTO: ABNORMAL /LPF
UROBILINOGEN UR STRIP-ACNC: 1 E.U./DL
WBC # BLD AUTO: 5.5 10E3/UL (ref 4–11)
WBC #/AREA URNS AUTO: ABNORMAL /HPF

## 2022-01-28 PROCEDURE — G0439 PPPS, SUBSEQ VISIT: HCPCS | Performed by: NURSE PRACTITIONER

## 2022-01-28 PROCEDURE — 85025 COMPLETE CBC W/AUTO DIFF WBC: CPT | Performed by: NURSE PRACTITIONER

## 2022-01-28 PROCEDURE — 80048 BASIC METABOLIC PNL TOTAL CA: CPT | Performed by: NURSE PRACTITIONER

## 2022-01-28 PROCEDURE — 81001 URINALYSIS AUTO W/SCOPE: CPT

## 2022-01-28 PROCEDURE — 36415 COLL VENOUS BLD VENIPUNCTURE: CPT | Performed by: NURSE PRACTITIONER

## 2022-01-28 PROCEDURE — 87086 URINE CULTURE/COLONY COUNT: CPT

## 2022-01-28 RX ORDER — ASPIRIN 81 MG/1
81 TABLET ORAL DAILY
COMMUNITY

## 2022-01-28 RX ORDER — CHLORAL HYDRATE 500 MG
1 CAPSULE ORAL DAILY
COMMUNITY

## 2022-01-28 RX ORDER — CALCIUM CARBONATE 500(1250)
500 TABLET,CHEWABLE ORAL PRN
COMMUNITY

## 2022-01-28 ASSESSMENT — ACTIVITIES OF DAILY LIVING (ADL)
CURRENT_FUNCTION: MONEY MANAGEMENT REQUIRES ASSISTANCE
CURRENT_FUNCTION: TRANSPORTATION REQUIRES ASSISTANCE
CURRENT_FUNCTION: SHOPPING REQUIRES ASSISTANCE

## 2022-01-28 ASSESSMENT — MIFFLIN-ST. JEOR: SCORE: 876.98

## 2022-01-28 NOTE — PROGRESS NOTES
"SUBJECTIVE:   Ambar Dutton is a 89 year old female who presents for Preventive Visit.      Patient has been advised of split billing requirements and indicates understanding: Yes  Are you in the first 12 months of your Medicare coverage?  No    Healthy Habits:     In general, how would you rate your overall health?  Good    Frequency of exercise:  4-5 days/week    Duration of exercise:  15-30 minutes    Do you usually eat at least 4 servings of fruit and vegetables a day, include whole grains    & fiber and avoid regularly eating high fat or \"junk\" foods?  No    Taking medications regularly:  Yes    Medication side effects:  None    Ability to successfully perform activities of daily living:  Transportation requires assistance, shopping requires assistance and money management requires assistance    Home Safety:  No safety concerns identified    Hearing Impairment:  No hearing concerns    In the past 6 months, have you been bothered by leaking of urine?  No    In general, how would you rate your overall mental or emotional health?  Good      PHQ-2 Total Score: 0    Additional concerns today:  No    Do you feel safe in your environment? Yes    Have you ever done Advance Care Planning? (For example, a Health Directive, POLST, or a discussion with a medical provider or your loved ones about your wishes): Yes, patient states has an Advance Care Planning document and will bring a copy to the clinic.       Fall risk  Fallen 2 or more times in the past year?: No  Any fall with injury in the past year?: No    Cognitive Screening   1) Repeat 3 items (Leader, Season, Table)    2) Clock draw: ABNORMAL   3) 3 item recall: Recalls 1 object   Results: 1 item recalled    Mini-CogTM Copyright KOJO Montesinos. Licensed by the author for use in [facility]; reprinted with permission ([email or web address]@.[email or web address]). All rights reserved.      Do you have sleep apnea, excessive snoring or daytime drowsiness?: no    Reviewed and updated as needed " this visit by clinical staff  Tobacco  Allergies  Meds             Reviewed and updated as needed this visit by Provider               Social History     Tobacco Use     Smoking status: Never Smoker     Smokeless tobacco: Never Used   Substance Use Topics     Alcohol use: Not Currently         Alcohol Use 1/28/2022   Prescreen: >3 drinks/day or >7 drinks/week? No   No flowsheet data found. No EOTH use        Current providers sharing in care for this patient include:   Patient Care Team:  Mary Barnes NP as PCP - General (Orthothist)  Mary Barnes NP as Assigned PCP    The following health maintenance items are reviewed in Epic and correct as of today:  Health Maintenance Due   Topic Date Due     ANNUAL REVIEW OF  ORDERS  Never done     ZOSTER IMMUNIZATION (2 of 3) 12/05/2012     FALL RISK ASSESSMENT  01/27/2022     MEDICARE ANNUAL WELLNESS VISIT  01/27/2022     BP Readings from Last 3 Encounters:   01/28/22 132/68   06/15/21 134/82   01/27/21 138/78    Wt Readings from Last 3 Encounters:   01/28/22 52.3 kg (115 lb 3.2 oz)   06/15/21 53.5 kg (118 lb)   01/27/21 53.5 kg (118 lb)                  Patient Active Problem List   Diagnosis     Other specified counseling     Anxiety disorder     Chest pain     Hypercholesteremia     Low back pain     Osteoporosis     Spinal stenosis of lumbar region     Stress-induced cardiomyopathy     Past Surgical History:   Procedure Laterality Date     OTHER SURGICAL HISTORY  2013    L3-4, L4-5 posterior decompression 2 level       Social History     Tobacco Use     Smoking status: Never Smoker     Smokeless tobacco: Never Used   Substance Use Topics     Alcohol use: Not Currently     No family history on file.      Current Outpatient Medications   Medication Sig Dispense Refill     ascorbic acid, vitamin C, (VITAMIN C) 500 MG tablet [ASCORBIC ACID, VITAMIN C, (VITAMIN C) 500 MG TABLET] Take 1,000 mg by mouth.       aspirin 81 MG EC tablet Take 81 mg by mouth daily    "    calcium carbonate 500 mg, elemental, 1250 (500 Ca) MG tablet chewable Take 500 mg by mouth as needed       calcium carbonate-vitamin D3 600 mg(1,500mg) -200 unit per tablet [CALCIUM CARBONATE-VITAMIN D3 600 MG(1,500MG) -200 UNIT PER TABLET] Take 1 tablet by mouth.       fish oil-omega-3 fatty acids 1000 MG capsule Take 1 g by mouth       meclizine (ANTIVERT) 25 mg tablet Take 25 mg by mouth daily as needed        niacin 250 MG tablet [NIACIN 250 MG TABLET] Take 250 mg by mouth daily with breakfast.       polyethylene glycol (MIRALAX) 17 gram packet [POLYETHYLENE GLYCOL (MIRALAX) 17 GRAM PACKET] Take 17 g by mouth daily.       Allergies   Allergen Reactions     Simvastatin Muscle Pain (Myalgia) and Other (See Comments)     Simvastatin and atorvastatin caused arm tingling and possible muscle discomfort.         Mammogram Screening - Mammography discussed and declined  Pertinent mammograms are reviewed under the imaging tab.    Review of Systems  Unremarkable other than listed above and below       OBJECTIVE:   BP (!) 152/80 (BP Location: Right arm, Patient Position: Sitting, Cuff Size: Adult Regular)   Pulse 96   Temp 98.1  F (36.7  C) (Temporal)   Resp 20   Ht 1.537 m (5' 0.5\")   Wt 52.3 kg (115 lb 3.2 oz)   SpO2 97%   BMI 22.13 kg/m   Estimated body mass index is 22.13 kg/m  as calculated from the following:    Height as of this encounter: 1.537 m (5' 0.5\").    Weight as of this encounter: 52.3 kg (115 lb 3.2 oz).  Physical Exam  GENERAL: healthy, alert and no distress  EYES: Eyes grossly normal to inspection, conjunctivae and sclerae normal  HENT: ear canals and TM's normal, nose and mouth without ulcers or lesions  NECK: no adenopathy  RESP: lungs clear to auscultation - no rales, rhonchi or wheezes  CV: regular rate and rhythm, normal S1 S2  ABDOMEN: soft, nontender, no hepatosplenomegaly, no masses and bowel sounds normal  SKIN: no suspicious lesions or rashes  PSYCH: mentation appears normal, affect " "normal/bright        ASSESSMENT / PLAN:     Problem List Items Addressed This Visit    None     Visit Diagnoses     Encounter for Medicare annual wellness exam    -  Primary    Relevant Orders    Basic metabolic panel (Completed)    CBC with platelets and differential (Completed)    UA Macro with Reflex to Micro and Culture - lab collect (Completed)              COUNSELING:  Reviewed preventive health counseling, as reflected in patient instructions       Regular exercise       Healthy diet/nutrition       Vision screening       Hearing screening    Estimated body mass index is 22.13 kg/m  as calculated from the following:    Height as of this encounter: 1.537 m (5' 0.5\").    Weight as of this encounter: 52.3 kg (115 lb 3.2 oz).        She reports that she has never smoked. She has never used smokeless tobacco.      Appropriate preventive services were discussed with this patient, including applicable screening as appropriate for cardiovascular disease, diabetes, osteopenia/osteoporosis, and glaucoma.  As appropriate for age/gender, discussed screening for colorectal cancer, prostate cancer, breast cancer, and cervical cancer. Checklist reviewing preventive services available has been given to the patient.    Reviewed patients plan of care and provided an AVS. The Basic Care Plan (routine screening as documented in Health Maintenance) for Ambar meets the Care Plan requirement. This Care Plan has been established and reviewed with the Patient.    Counseling Resources:  ATP IV Guidelines  Pooled Cohorts Equation Calculator  Breast Cancer Risk Calculator  Breast Cancer: Medication to Reduce Risk  FRAX Risk Assessment  ICSI Preventive Guidelines  Dietary Guidelines for Americans, 2010  USDA's MyPlate  ASA Prophylaxis  Lung CA Screening    Mary Barnes NP  [facility]    Identified Health Risks:  "

## 2022-01-28 NOTE — PATIENT INSTRUCTIONS
Patient Education   Personalized Prevention Plan  You are due for the preventive services outlined below.  Your care team is available to assist you in scheduling these services.  If you have already completed any of these items, please share that information with your care team to update in your medical record.  Health Maintenance Due   Topic Date Due     ANNUAL REVIEW OF  ORDERS  Never done     Zoster (Shingles) Vaccine (2 of 3) 12/05/2012     FALL RISK ASSESSMENT  01/27/2022     Annual Wellness Visit  01/27/2022        Patient Education     Prevention Guidelines, Women Ages 65 and Older  Screening tests and vaccines are an important part of managing your health. A screening test is done to find possible disorders or diseases in people who don't have any symptoms. The goal is to find a disease early so lifestyle changes can be made and you can be watched more closely to reduce the risk of disease, or to detect it early enough to treat it most effectively. Screening tests are not considered diagnostic, but are used to determine if more testing is needed. Health counseling is essential, too. Below are guidelines for these, for women ages 65 and older. Talk with your healthcare provider to make sure you re up to date on what you need.  Screening Who needs it How often   Type 2 diabetes or prediabetes All women beginning at age 45 and women without symptoms at any age who are overweight or obese and have 1 or more additional risk factors for diabetes At least every 3 years   Type 2 diabetes All women with prediabetes Every year   Unhealthy alcohol use All women in this age group At routine exams   Blood pressure All women in this age group Yearly checkup if your blood pressure is normal  Normal blood pressure is less than 120/80 mm Hg  If your blood pressure reading is higher than normal, follow the advice of your healthcare provider   Breast cancer All women of average risk Mammograms should be done every 1 or 2  years. Talk with your healthcare provider about your risk factors and how often you need the test and for how long.   Cervical cancer Only women who had abnormal screening results before age 65 Talk with your healthcare provider   Chlamydia Women at increased risk for infection At routine exams   Colorectal cancer All women at average risk in this age group through age 75 who are in good health. For women ages 76 to 85, talk with your healthcare provider about whether to continue screening. For women 85 and older, screening is not needed. Multiple tests are available and are used at different times. Possible tests include:    Flexible sigmoidoscopy every 5 years, or    Colonoscopy every 10 years, or    CT colonography (virtual colonoscopy) every 5 years, or    Yearly fecal occult blood test, or    Yearly fecal immunochemical test every year, or    Stool DNA test, every 3 years  If you choose a test other than a colonoscopy and have an abnormal test result, you will need to follow-up with a colonoscopy. Talk with your healthcare provider which test is best for you.  Some people should be screened using a different schedule because of their personal or family health history. Talk with your healthcare provider about your health history.   Depression All women in this age group At routine exams   Gonorrhea Sexually active women at increased risk for infection At yearly routine exams   Hepatitis C Anyone at increased risk; 1 time for those born between 1945 and 1965 At routine exams   High cholesterol or triglycerides All women in this age group who are at risk for coronary artery disease At least every 5 years; talk with your healthcare provider about your risk   HIV Women at increased risk for infection At routine exams; talk with your healthcare provider about your risk   Lung cancer Adults ages 55 to 74 who are in fairly good health and are at higher risk for lung cancer    Currently smoke or have quit within the  past 15 years    30-pack year smoking history  Eligibility criteria and age limit (possibly up to age 80) may vary across major organizations Yearly lung cancer screening with a low dose CT scan (LDCT); talk with your healthcare provider   Obesity All women in this age group At yearly routine exams   Osteoporosis All women in this age group Routinely done every 2 years, but repeat as advised by your healthcare provider   Syphilis Women at increased risk for infection At routine exams; talk with your healthcare provider   Thyroid-stimulating hormone (TSH) Only women in this age group with symptoms of thyroid dysfunction Talk with your healthcare provider   Tuberculosis Women at increased risk for infection Talk with your healthcare provider   Vision All women in this age group Every 1 to 2 years; if you have a chronic health condition, ask your healthcare provider if you need exams more often   Vaccine Who needs it How often   Chickenpox (varicella) All women in this age group who have no record of this infection or vaccine 2 doses; second dose should be given at least 4 weeks after the first dose   Hepatitis A Women at increased risk for infection 2 or 3 doses (depending on the vaccine) given at least 6 months apart; talk with your healthcare provider   Hepatitis B Women at increased risk for infection 2 or 3 doses (depending on the vaccine); second dose should be given 1 month after the first dose; if a the third dose, it should be given at least 2 months after the second dose and at least 4 months after the first dose   Haemophilus influenza type B (HIB) Women at increased risk for infection 1 to 3 doses; talk with your healthcare provider   Influenza (flu) All women in this age group Once a year   Pneumococcal conjugate vaccine (PCV13) and pneumococcal polysaccharide vaccine (PPSV23) All women in this age group  PPSV 23: women who have not been vaccinated or have not had infection  PCV 13: women at increased risk  for infection PPSV: 1 dose at age 65 or older  PCV 13: 1 dose at age 65 or older; talk with your healthcare provider   Tetanus/diphtheria/pertussis (Td/Tdap) booster All women in this age group Td every 10 years, or a 1-time dose of Tdap instead of a Td booster after age 18, then Td every 10 years   Zoster (shingles) All women in this age group 2 vaccines are available:    Recombinant zoster vaccine (RZV; Shigrix) is recommended as the preferred shingles vaccine. It's given in a series of 2 doses. The 2nd dose is given 2 to 6 months after the first. This is given even if you've had shingles before or had a previous zoster live vaccine.    Zoster live vaccine live (ZVL; Zostavax) may be given to healthy adults over age 60. It's given in one dose.   Counseling Who needs it How often   Diet and exercise Women who are overweight or obese When diagnosed, and then at routine exams   Fall prevention (exercise and vitamin D supplements) All women in this age group At yearly routine exams   Sexually transmitted infection prevention Women at increased risk for infection-talk with your healthcare provider At routine exams   Use of daily aspirin Women up to age 70 who are at high risk for cardiovascular problems and not at increased risk for bleeding as identified by your healthcare provider When your risk is known   Use of tobacco and the health effects it can cause All women in this age group Every exam   Vincent last reviewed this educational content on 7/1/2020 2000-2021 [employer]. All rights reserved. This information is not intended as a substitute for professional medical care. Always follow your healthcare professional's instructions.

## 2022-01-29 LAB — BACTERIA UR CULT: NORMAL

## 2022-02-04 NOTE — RESULT ENCOUNTER NOTE
Your urine culture showed just a mix of ghassan.  This would not be consistent with a true urinary tract infection.

## 2022-10-02 ENCOUNTER — HEALTH MAINTENANCE LETTER (OUTPATIENT)
Age: 87
End: 2022-10-02

## 2022-11-18 ENCOUNTER — OFFICE VISIT (OUTPATIENT)
Dept: INTERNAL MEDICINE | Facility: CLINIC | Age: 87
End: 2022-11-18
Payer: MEDICARE

## 2022-11-18 ENCOUNTER — HOSPITAL ENCOUNTER (OUTPATIENT)
Dept: GENERAL RADIOLOGY | Facility: HOSPITAL | Age: 87
Discharge: HOME | End: 2022-11-18
Attending: NURSE PRACTITIONER | Admitting: NURSE PRACTITIONER
Payer: MEDICARE

## 2022-11-18 VITALS
WEIGHT: 111.9 LBS | DIASTOLIC BLOOD PRESSURE: 80 MMHG | SYSTOLIC BLOOD PRESSURE: 150 MMHG | HEART RATE: 84 BPM | OXYGEN SATURATION: 97 % | BODY MASS INDEX: 21.49 KG/M2

## 2022-11-18 DIAGNOSIS — R19.7 DIARRHEA, UNSPECIFIED TYPE: ICD-10-CM

## 2022-11-18 DIAGNOSIS — R19.7 DIARRHEA, UNSPECIFIED TYPE: Primary | ICD-10-CM

## 2022-11-18 DIAGNOSIS — K59.00 CONSTIPATION, UNSPECIFIED CONSTIPATION TYPE: ICD-10-CM

## 2022-11-18 PROCEDURE — 74019 RADEX ABDOMEN 2 VIEWS: CPT

## 2022-11-18 PROCEDURE — 99213 OFFICE O/P EST LOW 20 MIN: CPT | Performed by: NURSE PRACTITIONER

## 2022-11-18 RX ORDER — ANTIOX #8/OM3/DHA/EPA/LUT/ZEAX 250-2.5 MG
2 CAPSULE ORAL DAILY
COMMUNITY
Start: 2018-01-01

## 2022-11-18 NOTE — PROGRESS NOTES
Assessment & Plan   Problem List Items Addressed This Visit    None  Visit Diagnoses     Diarrhea, unspecified type    -  Primary    Relevant Orders    XR Abdomen 2 Views (Completed)    Constipation, unspecified constipation type        Relevant Medications    methylcellulose (CITRUCEL) 500 MG TABS tablet             Recommend miralax 2 times daily for 1 day then daily thereafter, increase fiber in diet and follow up prn.     No follow-ups on file.    Mary Barnes NP  [facility] OLLIE Villalta is a 89 year old accompanied by her daughter, presenting for the following health issues:  Diarrhea (Occasional. She does take fiber first one was in July. And now has happened 2 more times. Thought at first ate to much veggies. Daughters did have sip on Pedialyte. Did start writing down what she has ate.  )      History of Present Illness       Reason for visit:  3 episodes of diarrhea within 4 months  Symptom onset:  More than a month  Symptoms include:  Diarrhea  Symptom intensity:  Severe  Symptom progression:  Staying the same  Had these symptoms before:  No    She eats 2-3 servings of fruits and vegetables daily.She consumes 0 sweetened beverage(s) daily.She exercises with enough effort to increase her heart rate 9 or less minutes per day.  She exercises with enough effort to increase her heart rate 3 or less days per week.   She is taking medications regularly.     Wt Readings from Last 5 Encounters:   11/18/22 50.8 kg (111 lb 14.4 oz)   01/28/22 52.3 kg (115 lb 3.2 oz)   06/15/21 53.5 kg (118 lb)   01/27/21 53.5 kg (118 lb)   07/08/20 53.1 kg (117 lb)             Review of Systems   Unremarkable other than listed above and below         Objective    BP (!) 150/80 (BP Location: Right arm, Patient Position: Sitting, Cuff Size: Adult Regular)   Pulse 84   Wt 50.8 kg (111 lb 14.4 oz)   SpO2 97%   BMI 21.49 kg/m    Body mass index is 21.49 kg/m .  Physical Exam   GENERAL: healthy,  alert and no distress  EYES: Eyes grossly normal to inspection,  conjunctivae and sclerae normal  NECK: no adenopathy  RESP:respirations regular nonlabored   ABDOMEN: soft, nontender, no hepatosplenomegaly, no masses and bowel sounds normal  PSYCH: mentation appears normal, affect normal/bright        XR Abdomen 2 Views  Narrative: EXAM: XR ABDOMEN 2 VIEWS  LOCATION: [facility]  DATE/TIME: 11/18/2022 11:27 AM    INDICATION:  Diarrhea, unspecified type  COMPARISON: None.  Impression: IMPRESSION: Nonobstructive bowel gas pattern. Large amount of stool throughout the colon. Scattered vascular calcifications

## 2022-11-20 ENCOUNTER — E-VISIT (OUTPATIENT)
Dept: INTERNAL MEDICINE | Facility: CLINIC | Age: 87
End: 2022-11-20
Payer: MEDICARE

## 2022-11-20 DIAGNOSIS — Z53.9 ERRONEOUS ENCOUNTER--DISREGARD: Primary | ICD-10-CM

## 2022-11-21 ENCOUNTER — HOSPITAL ENCOUNTER (EMERGENCY)
Facility: HOSPITAL | Age: 87
Discharge: HOME | End: 2022-11-21
Attending: EMERGENCY MEDICINE | Admitting: EMERGENCY MEDICINE
Payer: MEDICARE

## 2022-11-21 ENCOUNTER — APPOINTMENT (OUTPATIENT)
Dept: CT IMAGING | Facility: HOSPITAL | Age: 87
End: 2022-11-21
Attending: EMERGENCY MEDICINE
Payer: MEDICARE

## 2022-11-21 VITALS
TEMPERATURE: 97.4 F | SYSTOLIC BLOOD PRESSURE: 148 MMHG | RESPIRATION RATE: 16 BRPM | OXYGEN SATURATION: 92 % | DIASTOLIC BLOOD PRESSURE: 70 MMHG | BODY MASS INDEX: 20.43 KG/M2 | HEART RATE: 89 BPM | WEIGHT: 111 LBS | HEIGHT: 62 IN
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
## 2022-11-21 DIAGNOSIS — M54.6 ACUTE RIGHT-SIDED THORACIC BACK PAIN: ICD-10-CM

## 2022-11-21 DIAGNOSIS — R55 PRE-SYNCOPE: ICD-10-CM

## 2022-11-21 LAB
ANION GAP SERPL CALCULATED.3IONS-SCNC: 14 MMOL/L (ref 7–15)
BUN SERPL-MCNC: 20.3 MG/DL (ref 8–23)
CALCIUM SERPL-MCNC: 10.2 MG/DL (ref 8.2–9.6)
CHLORIDE SERPL-SCNC: 99 MMOL/L (ref 98–107)
CREAT SERPL-MCNC: 0.86 MG/DL (ref 0.51–0.95)
DEPRECATED HCO3 PLAS-SCNC: 26 MMOL/L (ref 22–29)
ERYTHROCYTE [DISTWIDTH] IN BLOOD BY AUTOMATED COUNT: 13.2 % (ref 10–15)
GFR SERPL CREATININE-BSD FRML MDRD: 64 ML/MIN/1.73M2
GLUCOSE SERPL-MCNC: 131 MG/DL (ref 70–99)
HCT VFR BLD AUTO: 42 % (ref 35–47)
HGB BLD-MCNC: 13.6 G/DL (ref 11.7–15.7)
HOLD SPECIMEN: NORMAL
LACTATE SERPL-SCNC: 0.9 MMOL/L (ref 0.7–2)
MCH RBC QN AUTO: 31.1 PG (ref 26.5–33)
MCHC RBC AUTO-ENTMCNC: 32.4 G/DL (ref 31.5–36.5)
MCV RBC AUTO: 96 FL (ref 78–100)
PLATELET # BLD AUTO: 277 10E3/UL (ref 150–450)
POTASSIUM SERPL-SCNC: 4.2 MMOL/L (ref 3.4–5.3)
RBC # BLD AUTO: 4.37 10E6/UL (ref 3.8–5.2)
SODIUM SERPL-SCNC: 139 MMOL/L (ref 136–145)
TROPONIN T SERPL HS-MCNC: 22 NG/L
WBC # BLD AUTO: 11.3 10E3/UL (ref 4–11)

## 2022-11-21 PROCEDURE — 82310 ASSAY OF CALCIUM: CPT | Performed by: EMERGENCY MEDICINE

## 2022-11-21 PROCEDURE — 85027 COMPLETE CBC AUTOMATED: CPT | Performed by: EMERGENCY MEDICINE

## 2022-11-21 PROCEDURE — 84484 ASSAY OF TROPONIN QUANT: CPT | Performed by: EMERGENCY MEDICINE

## 2022-11-21 PROCEDURE — 93005 ELECTROCARDIOGRAM TRACING: CPT | Performed by: EMERGENCY MEDICINE

## 2022-11-21 PROCEDURE — 74174 CTA ABD&PLVS W/CONTRAST: CPT | Mod: MA

## 2022-11-21 PROCEDURE — 36415 COLL VENOUS BLD VENIPUNCTURE: CPT | Performed by: EMERGENCY MEDICINE

## 2022-11-21 PROCEDURE — 71275 CT ANGIOGRAPHY CHEST: CPT | Mod: MA

## 2022-11-21 PROCEDURE — 99285 EMERGENCY DEPT VISIT HI MDM: CPT | Mod: 25

## 2022-11-21 PROCEDURE — 83605 ASSAY OF LACTIC ACID: CPT | Performed by: EMERGENCY MEDICINE

## 2022-11-21 PROCEDURE — 250N000011 HC RX IP 250 OP 636: Performed by: EMERGENCY MEDICINE

## 2022-11-21 RX ORDER — IOPAMIDOL 755 MG/ML
100 INJECTION, SOLUTION INTRAVASCULAR ONCE
Status: COMPLETED | OUTPATIENT
Start: 2022-11-21 | End: 2022-11-21

## 2022-11-21 RX ADMIN — IOPAMIDOL 100 ML: 755 INJECTION, SOLUTION INTRAVENOUS at 14:07

## 2022-11-21 ASSESSMENT — ACTIVITIES OF DAILY LIVING (ADL)
ADLS_ACUITY_SCORE: 35

## 2022-11-21 ASSESSMENT — ENCOUNTER SYMPTOMS
NUMBNESS: 0
BACK PAIN: 1
LIGHT-HEADEDNESS: 1
CONSTIPATION: 1

## 2022-11-21 NOTE — ED PROVIDER NOTES
EMERGENCY DEPARTMENT ENCOUNTER      NAME: Ambar Dutton  AGE: 89 year old female  YOB: 1932  MRN: [id number]  EVALUATION DATE & TIME: 2022 10:48 AM    PCP: Mary Barnes    ED PROVIDER: Abdirahman Johnson M.D.      Chief Complaint   Patient presents with     Fall         FINAL IMPRESSION:  1. Fall, initial encounter    2. Pre-syncope    3. Acute right-sided thoracic back pain          ED COURSE & MEDICAL DECISION MAKIN:46 AM I introduced myself to the patient, obtained patient history, performed a physical exam, and discussed plan for ED workup including potential diagnostic laboratory/imaging studies and interventions.    Pertinent Labs & Imaging studies reviewed. (See chart for details)  ED Course as of 22 1540   Mon 2022   1157 Patient is a 89-year-old with history of ACS, presenting with sudden onset of lightheadedness, near syncope and fall.  She has 2 days of atraumatic mid back pain, and now has some tenderness to the right posterior back.  She was hypertensive on arrival, this is normalized to 133.  She has normal heart rate.  She has normal neurologic exam here.  EKG does not show high-grade heart block.  Plan to screen for dissection given back pain, syncope, hypertension.  ACS work-up is underway.   1159 EKG shows sinus rhythm with a rate of 82.  No acute ischemic ST or T wave morphology.  Right bundle branch block.  Normal QTC, OH interval.  No prior EKG for comparison.  Impression: Sinus rhythm, right bundle branch block.   1236 WBC(!): 11.3   1236 Lactic Acid: 0.9   1437 CTA Chest Abdomen Pelvis w Contrast  1.  No acute aortic syndrome or acute pulmonary embolism.  2.  Severe bone demineralization with multiple age-indeterminate the low likely chronic thoracic and lumbar vertebral compression deformities. No acute rib fractures detected.  3.  Sigmoid diverticulosis but no diverticulitis.   I updated patient and daughter.  We discussed reassuring work-up.   "Discussed return precautions, Tylenol and ice for discomfort, follow-up with primary care doctor.    In terms of her presyncope symptoms, I did not think this was PE, she is not short of breath      At the conclusion of the encounter I discussed the results of all of the tests and the disposition. The questions were answered. The patient or family acknowledged understanding and was agreeable with the care plan.       MEDICATIONS GIVEN IN THE EMERGENCY:  Medications   iopamidol (ISOVUE-370) solution 100 mL (100 mLs Intravenous Given 11/21/22 1407)         NEW PRESCRIPTIONS STARTED AT TODAY'S ER VISIT  Discharge Medication List as of 11/21/2022  2:59 PM             =================================================================    HPI    Patient information was obtained from: Patient and daughter    Use of : N/A        Ambar Dutton is a 89 year old female with a pertinent history of low back pain, cardiomyopathy, osteoporosis and hypercholesteremia who presents to this ED via walk in for evaluation of fall.    Per patient, the patient was standing at her kitchen table this morning when she had a \"funny feeling\" and fell. She notes that she didn't walk or trip on anything and just collapsed. Patient endorses feeling lightheaded and notes that her \"mind is not right\". She is unsure if she lost consciousness or not during the fall since it was quick. She was able to get back up briefly afterwards and called her daughter who brought her to the ED for further evaluation. Patient currently endorses lower back pain and discomfort. She denies any chest pain or back pain prior to or during the fall. She was able to ambulate after the fall. Patient denies any leg numbness. She did not take any medications for her symptoms today. She is currently taking baby aspirin due to her heart attack 10 years ago.     Per patients daughter, the patient has a history of constipation and has been having bowel issues for the " "past couple of days. Patient also noted having a \"rock hard stomach\" and endorsed lower back pain that began last Saturday (2 days ago). She took clearlax for her bowel issues a couple of days ago.            REVIEW OF SYSTEMS   Review of Systems   Cardiovascular: Negative for chest pain.   Gastrointestinal: Positive for constipation (Chronic ).   Musculoskeletal: Positive for back pain (Lower).   Neurological: Positive for light-headedness. Negative for numbness.   All other systems reviewed and are negative.       PAST MEDICAL HISTORY:  History reviewed. No pertinent past medical history.    PAST SURGICAL HISTORY:  Past Surgical History:   Procedure Laterality Date     OTHER SURGICAL HISTORY  2013    L3-4, L4-5 posterior decompression 2 level           CURRENT MEDICATIONS:    No current facility-administered medications for this encounter.     Current Outpatient Medications   Medication     ascorbic acid, vitamin C, (VITAMIN C) 500 MG tablet     aspirin 81 MG EC tablet     calcium carbonate 500 mg, elemental, 1250 (500 Ca) MG tablet chewable     calcium carbonate-vitamin D3 600 mg(1,500mg) -200 unit per tablet     fish oil-omega-3 fatty acids 1000 MG capsule     meclizine (ANTIVERT) 25 mg tablet     methylcellulose (CITRUCEL) 500 MG TABS tablet     Multiple Vitamins-Minerals (PRESERVISION AREDS 2) CAPS     niacin 250 MG tablet     polyethylene glycol (MIRALAX) 17 gram packet       ALLERGIES:  Allergies   Allergen Reactions     Simvastatin Muscle Pain (Myalgia) and Other (See Comments)     Simvastatin and atorvastatin caused arm tingling and possible muscle discomfort.       FAMILY HISTORY:  No family history on file.    SOCIAL HISTORY:   Social History     Socioeconomic History     Marital status:    Tobacco Use     Smoking status: Never     Smokeless tobacco: Never   Vaping Use     Vaping Use: Never used   Substance and Sexual Activity     Alcohol use: Not Currently     Drug use: Not Currently     Sexual " "activity: Not Currently   Social History Narrative    Widowedx2 lost her  8 months ago (7.20).  She has 4 children who live in [place]. She enjoys golfing.     Former patient of Dr. Ian Ornelas.    Lives at [facility].       VITALS:  BP (!) 148/70   Pulse 89   Temp 97.4  F (36.3  C)   Resp 16   Ht 1.575 m (5' 2\")   Wt 50.3 kg (111 lb)   SpO2 92%   BMI 20.30 kg/m      PHYSICAL EXAM    CN II-XII intact. 5/5 strength in upper and lower extremities. No dysarthria or dysphagia. Sensation to light touch intact in all 4 extremities. No pronator drift. Normal rapid alternating movements bilaterally. Steady gait. Diffused thoracic back tenderness.      LAB:  All pertinent labs reviewed and interpreted.  Labs Ordered and Resulted from Time of ED Arrival to Time of ED Departure   CBC WITH PLATELETS - Abnormal       Result Value    WBC Count 11.3 (*)     RBC Count 4.37      Hemoglobin 13.6      Hematocrit 42.0      MCV 96      MCH 31.1      MCHC 32.4      RDW 13.2      Platelet Count 277     BASIC METABOLIC PANEL - Abnormal    Sodium 139      Potassium 4.2      Chloride 99      Carbon Dioxide (CO2) 26      Anion Gap 14      Urea Nitrogen 20.3      Creatinine 0.86      Calcium 10.2 (*)     Glucose 131 (*)     GFR Estimate 64     TROPONIN T, HIGH SENSITIVITY - Abnormal    Troponin T, High Sensitivity 22 (*)    LACTIC ACID WHOLE BLOOD - Normal    Lactic Acid 0.9         RADIOLOGY:  Reviewed all pertinent imaging. Please see official radiology report.  CTA Chest Abdomen Pelvis w Contrast   Final Result   IMPRESSION:      1.  No acute aortic syndrome or acute pulmonary embolism.   2.  Severe bone demineralization with multiple age-indeterminate the low likely chronic thoracic and lumbar vertebral compression deformities. No acute rib fractures detected.   3.  Sigmoid diverticulosis but no diverticulitis.                   EKG:    All EKG interpretations will be found in ED course above.    PROCEDURES: "         I, Ottoflavio Morris am serving as a scribe to document services personally performed by Dr. Abdirahman Johnson based on my observation and the provider's statements to me. I, Abdirahman Johnson MD attest that Irma Caceres is acting in a scribe capacity, has observed my performance of the services and has documented them in accordance with my direction.    Abdirahman Johnson M.D.  Emergency Medicine  [facility] EMERGENCY DEPARTMENT  [address] [place] [id number]  [phone]  Dept: [phone]     Abdirahman Johnson MD  11/21/22 4619

## 2022-11-21 NOTE — DISCHARGE INSTRUCTIONS
Think thankfully no dangerous sign of heart arrhythmia, heart attack, or dangerous causes of passing out.  I do not believe he fully lost consciousness, and the pain you are having is musculoskeletal, likely some bruised ribs but no fracture.  You can use Tylenol, ice for discomfort.

## 2022-11-21 NOTE — ED TRIAGE NOTES
"Pt became lightheaded  this am and fell in her kitchen. She did did not hit her head, not on blood thinners. Pt states she did not lose consciousness.  Pt hit her right ribs and is having some right rib and flank pain. Pt did go to her dr last Friday and had a \"abdomen full of stool\"  She has taken clearlax and did have a bm. Pt has had some low back pain. Daughter is with pt.      "

## 2022-11-23 NOTE — PATIENT INSTRUCTIONS
Thank you for choosing us for your care. I think an in-clinic visit would be best next steps based on your symptoms. Please schedule a clinic appointment; you won t be charged for this eVisit.      You can schedule an appointment right here in [facility], or call [phone]

## 2022-11-30 ENCOUNTER — VIRTUAL VISIT (OUTPATIENT)
Dept: INTERNAL MEDICINE | Facility: CLINIC | Age: 87
End: 2022-11-30
Payer: MEDICARE

## 2022-11-30 DIAGNOSIS — K59.00 CONSTIPATION, UNSPECIFIED CONSTIPATION TYPE: ICD-10-CM

## 2022-11-30 DIAGNOSIS — Z09 HOSPITAL DISCHARGE FOLLOW-UP: Primary | ICD-10-CM

## 2022-11-30 DIAGNOSIS — L03.116 CELLULITIS OF LEFT LOWER EXTREMITY: ICD-10-CM

## 2022-11-30 PROCEDURE — 99213 OFFICE O/P EST LOW 20 MIN: CPT | Performed by: NURSE PRACTITIONER

## 2022-11-30 RX ORDER — CEPHALEXIN 500 MG/1
500 CAPSULE ORAL 3 TIMES DAILY
Qty: 7 CAPSULE | Refills: 0 | Status: SHIPPED | OUTPATIENT
Start: 2022-11-30 | End: 2022-12-02

## 2022-11-30 NOTE — PROGRESS NOTES
"Ambar is a 89 year old who is being evaluated via a billable telephone visit.      What phone number would you like to be contacted at? [phone]  How would you like to obtain your AVS? MyChart    {PROVIDER CHARTING PREFERENCE:386425}    Subjective   Ambar is a 89 year old{ACCOMPANIED BY STATEMENT (Optional):715940}, presenting for the following health issues:  No chief complaint on file.      HPI     {SUPERLIST (Optional):342728}  {additonal problems for provider to add (Optional):306426}    Review of Systems   {ROS COMP (Optional):427566}      Objective           Vitals:  No vitals were obtained today due to virtual visit.    Physical Exam   {GENERAL APPEARANCE:50::\"healthy\",\"alert\",\"no distress\"}  PSYCH: Alert and oriented times 3; coherent speech, normal   rate and volume, able to articulate logical thoughts, able   to abstract reason, no tangential thoughts, no hallucinations   or delusions  Her affect is { :3600536::\"normal\"}  RESP: No cough, no audible wheezing, able to talk in full sentences  Remainder of exam unable to be completed due to telephone visits    {Diagnostic Test Results (Optional):396752}    {AMBULATORY ATTESTATION (Optional):185700}        Phone call duration: *** minutes    "

## 2022-11-30 NOTE — PROGRESS NOTES
Assessment & Plan   Problem List Items Addressed This Visit    None  Visit Diagnoses     Hospital discharge follow-up    -  Primary    Cellulitis of left lower extremity        Constipation, unspecified constipation type           constipation contributing to low back pain, recommend high fiber diet, adequate water intake.            MED REC REQUIRED  Post Medication Reconciliation Status:       MEDICATIONS:   Orders Placed This Encounter   Medications     DISCONTD: cephALEXin (KEFLEX) 500 MG capsule     Sig: Take 1 capsule (500 mg) by mouth 3 times daily     Dispense:  7 capsule     Refill:  0     There are no discontinued medications.       - Continue other medications without change    No follow-ups on file.    Mary Barnes NP  [facility] OLLIE Villalta is a 89 year old, presenting for the following health issues:  Hospital F/U      HPI     ED/UC Followup:    Facility:  [facility]   Date of visit: 11/21/22  Reason for visit: Syncope   Current Status: Back hurts        Review of Systems   Unremarkable other than listed above and below         Objective    There were no vitals taken for this visit.  There is no height or weight on file to calculate BMI.  Physical Exam   GENERAL: healthy, alert and no distress  EYES: Eyes grossly normal to inspection conjunctivae and sclerae normal  NECK: no adenopathy  RESP: lungs clear to auscultation - no rales, rhonchi or wheezes  CV: regular rate and rhythm, normal S1 S2,  SKIN:erythema, warmth of left lower extremity noted consistent with cellulitis     Admission on 11/21/2022, Discharged on 11/21/2022   Component Date Value Ref Range Status     Hold Specimen 11/21/2022 JI   Final     Hold Specimen 11/21/2022 JI   Final     Hold Specimen 11/21/2022 [facility]   Final     WBC Count 11/21/2022 11.3 (H)  4.0 - 11.0 10e3/uL Final     RBC Count 11/21/2022 4.37  3.80 - 5.20 10e6/uL Final     Hemoglobin 11/21/2022 13.6  11.7 - 15.7 g/dL Final      Hematocrit 11/21/2022 42.0  35.0 - 47.0 % Final     MCV 11/21/2022 96  78 - 100 fL Final     MCH 11/21/2022 31.1  26.5 - 33.0 pg Final     MCHC 11/21/2022 32.4  31.5 - 36.5 g/dL Final     RDW 11/21/2022 13.2  10.0 - 15.0 % Final     Platelet Count 11/21/2022 277  150 - 450 10e3/uL Final     Sodium 11/21/2022 139  136 - 145 mmol/L Final     Potassium 11/21/2022 4.2  3.4 - 5.3 mmol/L Final     Chloride 11/21/2022 99  98 - 107 mmol/L Final     Carbon Dioxide (CO2) 11/21/2022 26  22 - 29 mmol/L Final     Anion Gap 11/21/2022 14  7 - 15 mmol/L Final     Urea Nitrogen 11/21/2022 20.3  8.0 - 23.0 mg/dL Final     Creatinine 11/21/2022 0.86  0.51 - 0.95 mg/dL Final     Calcium 11/21/2022 10.2 (H)  8.2 - 9.6 mg/dL Final     Glucose 11/21/2022 131 (H)  70 - 99 mg/dL Final     GFR Estimate 11/21/2022 64  >60 mL/min/1.73m2 Final    Effective December 21, 2021 eGFRcr in adults is calculated using the 2021 CKD-EPI creatinine equation which includes age and gender (Maryjane britt al., [place], DOI: 10.1056/ETMQtd8506901)     Lactic Acid 11/21/2022 0.9  0.7 - 2.0 mmol/L Final     Troponin T, High Sensitivity 11/21/2022 22 (H)  <=14 ng/L Final    Either a High Sensitivity Troponin T baseline (0 hours) value = 100 ng/mL, or an increase in High Sensitivity Troponin T = 7 ng/mL at 2 hours compared to 0 hours (2-0 hours), suggests myocardial injury, and urgent clinical attention is required.    If the 2-0 hours increase is<7 ng/mL, a High Sensitivity Troponin T result above gender-specific reference ranges warrants further evaluation.   Recommendations for further evaluation include correlation with clinical decision-making tool (e.g., HEART), a 3rd High Sensitivity Troponin T test 2 hours after the 2nd (a 20% change from baseline would represent concern), admission for observation, close PCC/cardiology follow-up, or urgent outpatient provocative testing.

## 2022-12-02 ENCOUNTER — OFFICE VISIT (OUTPATIENT)
Dept: INTERNAL MEDICINE | Facility: CLINIC | Age: 87
End: 2022-12-02
Payer: MEDICARE

## 2022-12-02 VITALS
SYSTOLIC BLOOD PRESSURE: 160 MMHG | WEIGHT: 114 LBS | BODY MASS INDEX: 20.98 KG/M2 | HEART RATE: 76 BPM | HEIGHT: 62 IN | OXYGEN SATURATION: 96 % | DIASTOLIC BLOOD PRESSURE: 66 MMHG | RESPIRATION RATE: 16 BRPM | TEMPERATURE: 98 F

## 2022-12-02 DIAGNOSIS — T14.8XXA SKIN ABRASION: Primary | ICD-10-CM

## 2022-12-02 DIAGNOSIS — L03.116 CELLULITIS OF LEFT LOWER EXTREMITY: ICD-10-CM

## 2022-12-02 PROCEDURE — 99213 OFFICE O/P EST LOW 20 MIN: CPT | Performed by: NURSE PRACTITIONER

## 2022-12-02 RX ORDER — CEPHALEXIN 500 MG/1
500 CAPSULE ORAL 3 TIMES DAILY
Qty: 15 CAPSULE | Refills: 0 | Status: SHIPPED | OUTPATIENT
Start: 2022-12-02 | End: 2022-12-05

## 2022-12-02 ASSESSMENT — PAIN SCALES - GENERAL: PAINLEVEL: WORST PAIN (10)

## 2022-12-02 NOTE — PROGRESS NOTES
"  Assessment & Plan   Problem List Items Addressed This Visit    None  Visit Diagnoses     Skin abrasion    -  Primary    Cellulitis of left lower extremity        Relevant Medications    cephALEXin (KEFLEX) 500 MG capsule      Patient presents clinic today for follow-up of skin abrasion and cellulitis improvement is noted we will apply bandage followed by gauze and Curlex continue Keflex as prescribed patient will follow-up on Monday. Patient advised if symptoms persist, worsen or new symptoms arise they are to seek medical care.  All patients questions addressed. Patient verbalized understanding and agreement with plan.                MEDICATIONS:   Orders Placed This Encounter   Medications     cephALEXin (KEFLEX) 500 MG capsule     Sig: Take 1 capsule (500 mg) by mouth 3 times daily for 5 days     Dispense:  15 capsule     Refill:  0          - Continue other medications without change    No follow-ups on file.    Mary Barnes NP  [facility]    Amy Villalta is a 89 year old accompanied by her daughter, presenting for the following health issues:  Follow Up (Daughter states the bandage keeps falling down does not stay up and was told not to change it until she comes in)      History of Present Illness       Reason for visit:  Follow up visit for infection on my leg    She eats 2-3 servings of fruits and vegetables daily.She consumes 0 sweetened beverage(s) daily.She exercises with enough effort to increase her heart rate 10 to 19 minutes per day.  She exercises with enough effort to increase her heart rate 3 or less days per week.   She is taking medications regularly.             Review of Systems   Unremarkable other than listed above and below         Objective    BP (!) 160/66 (BP Location: Right arm, Patient Position: Sitting, Cuff Size: Adult Regular)   Pulse 76   Temp 98  F (36.7  C) (Oral)   Resp 16   Ht 1.575 m (5' 2\")   Wt 51.7 kg (114 lb)   SpO2 96%   BMI 20.85 " kg/m    Body mass index is 20.85 kg/m .  Physical Exam   GENERAL: healthy, alert and no distress  EYES: Eyes grossly normal to inspection, conjunctivae and sclerae normal  RESP: Respirations are regular nonlabored  SKIN: Patient noted to have abrasion left shin improvement in the erythema or surrounding the area noted slightly warm to the touch.  No edema noted  PSYCH: mentation appears normal, affect normal/bright

## 2022-12-05 ENCOUNTER — OFFICE VISIT (OUTPATIENT)
Dept: FAMILY MEDICINE | Facility: CLINIC | Age: 87
End: 2022-12-05
Payer: MEDICARE

## 2022-12-05 VITALS
DIASTOLIC BLOOD PRESSURE: 68 MMHG | SYSTOLIC BLOOD PRESSURE: 160 MMHG | BODY MASS INDEX: 21.16 KG/M2 | HEART RATE: 77 BPM | WEIGHT: 115 LBS | HEIGHT: 62 IN | TEMPERATURE: 98.8 F

## 2022-12-05 DIAGNOSIS — L03.116 CELLULITIS OF LEFT LOWER EXTREMITY: ICD-10-CM

## 2022-12-05 PROCEDURE — 99203 OFFICE O/P NEW LOW 30 MIN: CPT | Performed by: FAMILY MEDICINE

## 2022-12-05 RX ORDER — CEPHALEXIN 500 MG/1
500 CAPSULE ORAL 3 TIMES DAILY
Qty: 15 CAPSULE | Refills: 0 | Status: SHIPPED | OUTPATIENT
Start: 2022-12-05 | End: 2022-12-10

## 2022-12-05 NOTE — PROGRESS NOTES
"  Assessment & Plan     Cellulitis of left lower extremity  Improving   - extensive hyperemia surrounding the wound, without pain     Plan:   Extension of antibiotic for another 3 to 5 days  - cephALEXin (KEFLEX) 500 MG capsule; Take 1 capsule (500 mg) by mouth 3 times daily for 5 days  -Continue with wound check and dressing changes daily  -Expect full recovery in the next 5 to 7 days.   Close follow up if no significant change or improvement as anticipated.          I spent a total of 35 minutes on the day of the visit.   Time spent doing chart review, history and exam, documentation and further activities per the note           No follow-ups on file.    Zacarias Denis MD  [facility] OLLIE Villalta is a 89 year old accompanied by her daughter, presenting for skin/wound check of the left lower extremity.  In brief she is sustained scraping of her skin, falling at her son's house, which got infected and was put on the antibiotics since last week.   She believes that the current therapy is helping and overall improving. She has another day left of taking her antibiotic.      Wound Check    History of Present Illness       Reason for visit:  Follow up visit for infection on my leg    She eats 2-3 servings of fruits and vegetables daily.She consumes 0 sweetened beverage(s) daily.She exercises with enough effort to increase her heart rate 10 to 19 minutes per day.  She exercises with enough effort to increase her heart rate 3 or less days per week.   She is taking medications regularly.           Review of Systems         Objective    BP (!) 160/68 (BP Location: Right arm, Patient Position: Sitting, Cuff Size: Adult Regular)   Pulse 77   Temp 98.8  F (37.1  C) (Oral)   Ht 1.575 m (5' 2\")   Wt 52.2 kg (115 lb)   BMI 21.03 kg/m    Body mass index is 21.03 kg/m .  Physical Exam     Left leg:  Healing wound, with surrounding hyperemia, nontender to palpation                    "

## 2023-02-08 ENCOUNTER — LAB (OUTPATIENT)
Dept: LAB | Facility: CLINIC | Age: 88
End: 2023-02-08
Payer: MEDICARE

## 2023-02-08 ENCOUNTER — OFFICE VISIT (OUTPATIENT)
Dept: FAMILY MEDICINE | Facility: CLINIC | Age: 88
End: 2023-02-08
Payer: MEDICARE

## 2023-02-08 VITALS
BODY MASS INDEX: 20.18 KG/M2 | HEIGHT: 61 IN | HEART RATE: 77 BPM | RESPIRATION RATE: 18 BRPM | SYSTOLIC BLOOD PRESSURE: 163 MMHG | OXYGEN SATURATION: 97 % | WEIGHT: 106.9 LBS | TEMPERATURE: 98.2 F | DIASTOLIC BLOOD PRESSURE: 73 MMHG

## 2023-02-08 DIAGNOSIS — Z00.00 ENCOUNTER FOR MEDICARE ANNUAL WELLNESS EXAM: ICD-10-CM

## 2023-02-08 DIAGNOSIS — E78.00 HYPERCHOLESTEREMIA: ICD-10-CM

## 2023-02-08 DIAGNOSIS — Z00.00 ENCOUNTER FOR MEDICARE ANNUAL WELLNESS EXAM: Primary | ICD-10-CM

## 2023-02-08 LAB
ANION GAP SERPL CALCULATED.3IONS-SCNC: 12 MMOL/L (ref 7–15)
BASOPHILS # BLD AUTO: 0 10E3/UL (ref 0–0.2)
BASOPHILS NFR BLD AUTO: 1 %
BUN SERPL-MCNC: 22.1 MG/DL (ref 8–23)
CALCIUM SERPL-MCNC: 9.8 MG/DL (ref 8.2–9.6)
CHLORIDE SERPL-SCNC: 103 MMOL/L (ref 98–107)
CHOLEST SERPL-MCNC: 275 MG/DL
CREAT SERPL-MCNC: 0.92 MG/DL (ref 0.51–0.95)
DEPRECATED HCO3 PLAS-SCNC: 27 MMOL/L (ref 22–29)
EOSINOPHIL # BLD AUTO: 0.1 10E3/UL (ref 0–0.7)
EOSINOPHIL NFR BLD AUTO: 1 %
ERYTHROCYTE [DISTWIDTH] IN BLOOD BY AUTOMATED COUNT: 13.5 % (ref 10–15)
GFR SERPL CREATININE-BSD FRML MDRD: 59 ML/MIN/1.73M2
GLUCOSE SERPL-MCNC: 101 MG/DL (ref 70–99)
HCT VFR BLD AUTO: 42 % (ref 35–47)
HDLC SERPL-MCNC: 98 MG/DL
HGB BLD-MCNC: 13.7 G/DL (ref 11.7–15.7)
IMM GRANULOCYTES # BLD: 0 10E3/UL
IMM GRANULOCYTES NFR BLD: 0 %
LDLC SERPL CALC-MCNC: 160 MG/DL
LYMPHOCYTES # BLD AUTO: 1.3 10E3/UL (ref 0.8–5.3)
LYMPHOCYTES NFR BLD AUTO: 21 %
MCH RBC QN AUTO: 30.6 PG (ref 26.5–33)
MCHC RBC AUTO-ENTMCNC: 32.6 G/DL (ref 31.5–36.5)
MCV RBC AUTO: 94 FL (ref 78–100)
MONOCYTES # BLD AUTO: 0.5 10E3/UL (ref 0–1.3)
MONOCYTES NFR BLD AUTO: 8 %
NEUTROPHILS # BLD AUTO: 4.3 10E3/UL (ref 1.6–8.3)
NEUTROPHILS NFR BLD AUTO: 70 %
NONHDLC SERPL-MCNC: 177 MG/DL
PLATELET # BLD AUTO: 266 10E3/UL (ref 150–450)
POTASSIUM SERPL-SCNC: 4.3 MMOL/L (ref 3.4–5.3)
RBC # BLD AUTO: 4.47 10E6/UL (ref 3.8–5.2)
SODIUM SERPL-SCNC: 142 MMOL/L (ref 136–145)
TRIGL SERPL-MCNC: 87 MG/DL
WBC # BLD AUTO: 6.1 10E3/UL (ref 4–11)

## 2023-02-08 PROCEDURE — 36415 COLL VENOUS BLD VENIPUNCTURE: CPT

## 2023-02-08 PROCEDURE — 85025 COMPLETE CBC W/AUTO DIFF WBC: CPT

## 2023-02-08 PROCEDURE — 80048 BASIC METABOLIC PNL TOTAL CA: CPT

## 2023-02-08 PROCEDURE — G0439 PPPS, SUBSEQ VISIT: HCPCS | Performed by: NURSE PRACTITIONER

## 2023-02-08 PROCEDURE — 80061 LIPID PANEL: CPT

## 2023-02-08 ASSESSMENT — ENCOUNTER SYMPTOMS
PARESTHESIAS: 0
CHILLS: 0
NERVOUS/ANXIOUS: 0
COUGH: 0
ARTHRALGIAS: 0
HEMATURIA: 0
HEADACHES: 0
DYSURIA: 0
DIARRHEA: 0
JOINT SWELLING: 0
SORE THROAT: 0
MYALGIAS: 0
SHORTNESS OF BREATH: 0
PALPITATIONS: 0
BREAST MASS: 0
ABDOMINAL PAIN: 0
EYE PAIN: 0
HEMATOCHEZIA: 0
FREQUENCY: 0
DIZZINESS: 0
HEARTBURN: 0
FEVER: 0
CONSTIPATION: 1
WEAKNESS: 0
NAUSEA: 0

## 2023-02-08 ASSESSMENT — ACTIVITIES OF DAILY LIVING (ADL): CURRENT_FUNCTION: TRANSPORTATION REQUIRES ASSISTANCE

## 2023-02-08 ASSESSMENT — PAIN SCALES - GENERAL: PAINLEVEL: NO PAIN (0)

## 2023-02-08 NOTE — PROGRESS NOTES
"SUBJECTIVE:   Ambar is a 89 year old who presents for Preventive Visit.  Patient has been advised of split billing requirements and indicates understanding: Yes  Are you in the first 12 months of your Medicare coverage?  No    Healthy Habits:     In general, how would you rate your overall health?  Good    Frequency of exercise:  2-3 days/week    Duration of exercise:  Less than 15 minutes    Do you usually eat at least 4 servings of fruit and vegetables a day, include whole grains    & fiber and avoid regularly eating high fat or \"junk\" foods?  Yes    Taking medications regularly:  Yes    Medication side effects:  Not applicable    Ability to successfully perform activities of daily living:  Transportation requires assistance    Home Safety:  No safety concerns identified    Hearing Impairment:  No hearing concerns    In the past 6 months, have you been bothered by leaking of urine?  No    In general, how would you rate your overall mental or emotional health?  Good      PHQ-2 Total Score: 0    Additional concerns today:  No      Have you ever done Advance Care Planning? (For example, a Health Directive, POLST, or a discussion with a medical provider or your loved ones about your wishes): Yes, patient states has an Advance Care Planning document and will bring a copy to the clinic.      Fall risk  Fallen 2 or more times in the past year?: Yes  Any fall with injury in the past year?: Yes   1 fall with injury- has wheeled walker, provided education    Cognitive Screening   1) Repeat 3 items (Leader, Season, Table)    2) Clock draw: ABNORMAL time incorrect  3) 3 item recall: Recalls 1 object   Results: ABNORMAL clock, 1-2 items recalled: PROBABLE COGNITIVE IMPAIRMENT, **INFORM PROVIDER**    Mini-CogTM Copyright KOJO Montesinos. Licensed by the author for use in [facility]; reprinted with permission (daniel@.[facility]). All rights reserved.      Do you have sleep apnea, excessive snoring or daytime drowsiness?: " no    Reviewed and updated as needed this visit by clinical staff    Allergies  Meds              Reviewed and updated as needed this visit by Provider                 Social History     Tobacco Use     Smoking status: Never     Smokeless tobacco: Never   Substance Use Topics     Alcohol use: Not Currently     If you drink alcohol do you typically have >3 drinks per day or >7 drinks per week? Not applicable    Alcohol Use 2/8/2023   Prescreen: >3 drinks/day or >7 drinks/week? -   Prescreen: >3 drinks/day or >7 drinks/week? Not Applicable           Current providers sharing in care for this patient include:   Patient Care Team:  Mary Barnes NP as PCP - General (Orthothist)  Mary Barnes NP as Assigned PCP    The following health maintenance items are reviewed in Epic and correct as of today:  Health Maintenance   Topic Date Due     ZOSTER IMMUNIZATION (2 of 3) 12/05/2012     DTAP/TDAP/TD IMMUNIZATION (2 - Td or Tdap) 01/07/2024     MEDICARE ANNUAL WELLNESS VISIT  02/08/2024     ANNUAL REVIEW OF HM ORDERS  02/08/2024     FALL RISK ASSESSMENT  02/08/2024     ADVANCE CARE PLANNING  02/08/2028     PHQ-2 (once per calendar year)  Completed     INFLUENZA VACCINE  Completed     Pneumococcal Vaccine: 65+ Years  Completed     COVID-19 Vaccine  Completed     IPV IMMUNIZATION  Aged Out     MENINGITIS IMMUNIZATION  Aged Out     Lab work is in process  Mammogram Screening: Mammogram Screening - Mammography discussed, not appropriate due to age    Pertinent mammograms are reviewed under the imaging tab.    Review of Systems   Constitutional: Negative for chills and fever.   HENT: Negative for congestion, ear pain, hearing loss and sore throat.    Eyes: Negative for pain and visual disturbance.   Respiratory: Negative for cough and shortness of breath.    Cardiovascular: Negative for chest pain, palpitations and peripheral edema.   Gastrointestinal: Positive for constipation. Negative for abdominal pain, diarrhea,  "heartburn, hematochezia and nausea.   Breasts:  Negative for tenderness, breast mass and discharge.   Genitourinary: Negative for dysuria, frequency, genital sores, hematuria, pelvic pain, urgency, vaginal bleeding and vaginal discharge.   Musculoskeletal: Negative for arthralgias, joint swelling and myalgias.   Skin: Negative for rash.   Neurological: Negative for dizziness, weakness, headaches and paresthesias.   Psychiatric/Behavioral: Negative for mood changes. The patient is not nervous/anxious.      CONSTITUTIONAL: NEGATIVE for fever, chills, change in weight  ENT/MOUTH: NEGATIVE for ear, mouth and throat problems  RESP: NEGATIVE for significant cough or SOB  CV: NEGATIVE for chest pain, palpitations or peripheral edema    OBJECTIVE:   BP (!) 163/73 (BP Location: Right arm, Patient Position: Sitting, Cuff Size: Adult Regular)   Pulse 77   Temp 98.2  F (36.8  C)   Resp 18   Ht 1.543 m (5' 0.75\")   Wt 48.5 kg (106 lb 14.4 oz)   SpO2 97%   BMI 20.37 kg/m   Estimated body mass index is 20.37 kg/m  as calculated from the following:    Height as of this encounter: 1.543 m (5' 0.75\").    Weight as of this encounter: 48.5 kg (106 lb 14.4 oz).  Physical Exam  GENERAL APPEARANCE: healthy, alert and no distress    Diagnostic Test Results:  Labs reviewed in Epic    ASSESSMENT / PLAN:   Ambar was seen today for physical.    Diagnoses and all orders for this visit:    Encounter for Medicare annual wellness exam  -     REVIEW OF HEALTH MAINTENANCE PROTOCOL ORDERS  -     CBC with platelets and differential; Future  -     Basic metabolic panel  (Ca, Cl, CO2, Creat, Gluc, K, Na, BUN); Future    Hypercholesteremia  -     Lipid panel reflex to direct LDL Fasting; Future        Patient has been advised of split billing requirements and indicates understanding: Yes      COUNSELING:  Reviewed preventive health counseling, as reflected in patient instructions       Regular exercise       Healthy diet/nutrition       Fall risk " prevention       Osteoporosis prevention/bone health       Advanced Planning         She reports that she has never smoked. She has never used smokeless tobacco.      Appropriate preventive services were discussed with this patient, including applicable screening as appropriate for cardiovascular disease, diabetes, osteopenia/osteoporosis, and glaucoma.  As appropriate for age/gender, discussed screening for colorectal cancer, prostate cancer, breast cancer, and cervical cancer. Checklist reviewing preventive services available has been given to the patient.    Reviewed patients plan of care and provided an AVS. The Basic Care Plan (routine screening as documented in Health Maintenance) for Ambar meets the Care Plan requirement. This Care Plan has been established and reviewed with the Patient and daughter.            [facility]    Identified Health Risks:

## 2023-02-08 NOTE — PATIENT INSTRUCTIONS
Patient Education   Personalized Prevention Plan  You are due for the preventive services outlined below.  Your care team is available to assist you in scheduling these services.  If you have already completed any of these items, please share that information with your care team to update in your medical record.  Health Maintenance Due   Topic Date Due     Zoster (Shingles) Vaccine (2 of 3) 12/05/2012

## 2023-03-14 ENCOUNTER — TELEPHONE (OUTPATIENT)
Dept: INTERNAL MEDICINE | Facility: CLINIC | Age: 88
End: 2023-03-14
Payer: MEDICARE

## 2023-03-14 NOTE — TELEPHONE ENCOUNTER
Patient family member dropped off copy of Health Care Directive and Power of  documents.  Forwarded via email to [facility] and copy sent to HIM for scanning.

## 2023-03-21 ENCOUNTER — DOCUMENTATION ONLY (OUTPATIENT)
Dept: OTHER | Facility: CLINIC | Age: 88
End: 2023-03-21
Payer: MEDICARE

## 2023-07-02 ENCOUNTER — APPOINTMENT (OUTPATIENT)
Dept: CT IMAGING | Facility: HOSPITAL | Age: 88
End: 2023-07-02
Attending: EMERGENCY MEDICINE
Payer: MEDICARE

## 2023-07-02 ENCOUNTER — HOSPITAL ENCOUNTER (EMERGENCY)
Facility: HOSPITAL | Age: 88
Discharge: HOME | End: 2023-07-02
Attending: EMERGENCY MEDICINE | Admitting: EMERGENCY MEDICINE
Payer: MEDICARE

## 2023-07-02 VITALS
OXYGEN SATURATION: 96 % | RESPIRATION RATE: 16 BRPM | WEIGHT: 114 LBS | DIASTOLIC BLOOD PRESSURE: 86 MMHG | BODY MASS INDEX: 22.98 KG/M2 | HEIGHT: 59 IN | TEMPERATURE: 98.4 F | HEART RATE: 74 BPM | SYSTOLIC BLOOD PRESSURE: 134 MMHG

## 2023-07-02 DIAGNOSIS — S22.000A THORACIC COMPRESSION FRACTURE, CLOSED, INITIAL ENCOUNTER (H): ICD-10-CM

## 2023-07-02 PROCEDURE — G1010 CDSM STANSON: HCPCS

## 2023-07-02 PROCEDURE — 72131 CT LUMBAR SPINE W/O DYE: CPT | Mod: MF

## 2023-07-02 PROCEDURE — 250N000013 HC RX MED GY IP 250 OP 250 PS 637: Performed by: EMERGENCY MEDICINE

## 2023-07-02 PROCEDURE — 99284 EMERGENCY DEPT VISIT MOD MDM: CPT | Mod: 25

## 2023-07-02 RX ORDER — HYDROCODONE BITARTRATE AND ACETAMINOPHEN 5; 325 MG/1; MG/1
1 TABLET ORAL EVERY 8 HOURS
Qty: 10 TABLET | Refills: 0 | Status: SHIPPED | OUTPATIENT
Start: 2023-07-02 | End: 2023-07-05

## 2023-07-02 RX ORDER — AMOXICILLIN 250 MG
1 CAPSULE ORAL DAILY
Qty: 10 TABLET | Refills: 0 | Status: SHIPPED | OUTPATIENT
Start: 2023-07-02 | End: 2023-07-14

## 2023-07-02 RX ORDER — HYDROCODONE BITARTRATE AND ACETAMINOPHEN 5; 325 MG/1; MG/1
1 TABLET ORAL ONCE
Status: COMPLETED | OUTPATIENT
Start: 2023-07-02 | End: 2023-07-02

## 2023-07-02 RX ADMIN — HYDROCODONE BITARTRATE AND ACETAMINOPHEN 1 TABLET: 5; 325 TABLET ORAL at 13:05

## 2023-07-02 ASSESSMENT — ACTIVITIES OF DAILY LIVING (ADL): ADLS_ACUITY_SCORE: 37

## 2023-07-02 NOTE — DISCHARGE INSTRUCTIONS
Consult is been placed to spine clinic.  They should reach out to you in the next day or 2 to schedule an appointment

## 2023-07-02 NOTE — ED TRIAGE NOTES
Pt with c/o back pain that started on Thursday.  Pt reports pain from shoulders down to waist.  Has been using tylenol every 6 hrs but has not helped.  Pt denies any injury.  Pain worse with mvmt.

## 2023-07-02 NOTE — ED PROVIDER NOTES
EMERGENCY DEPARTMENT ENCOUNTER      NAME: Ambar Dutton  AGE: 89 year old female  YOB: 1932  MRN: [id number]  EVALUATION DATE & TIME: 7/2/2023 12:50 PM    PCP: Mary Barnes    ED PROVIDER: Berny Mandujano M.D.      Chief Complaint   Patient presents with     Back Pain         FINAL IMPRESSION:  T8 compression fracture progression  Chronic lumbar and thoracic compression fractures    ED COURSE & MEDICAL DECISION MAKING:    Pertinent Labs & Imaging studies reviewed. (See chart for details)  89 year old female presents to the Emergency Department for evaluation of diffuse back pain.  Patient describes an achiness starts in her shoulders and goes down towards her hips.  She blames it on wearing new shoes on Tuesday.  She had been quite active on Tuesday and was slightly achy.  However on Wednesday she had no problems.  In the last few days much worsening pain.  Exacerbated by movements.  Denies any radiation to her legs.  No chest pain shortness of breath, abdominal pain.  Review of records indicate relatively recent CTA with extensive compression fractures of both the thoracic and lumbar spine.  Examination reveals mild diffuse thoracic and lumbar tenderness.  No focal findings.  Will obtain CT to assess for new compression fracture.  No signs of cardiovascular or pneumonic process to suggest need for further imaging or laboratory evaluation.  Patient appears non toxic with stable vitals signs.     12:54 PM  I met with the patient for the initial interview and physical examination. Discussed plan for treatment and workup in the ED.    1:45 PM.  CT images reviewed.  Multiple compression fractures of both the lumbar and thoracic area noted.  Fairly consistent with prior report.  Awaiting definitive x-ray report from radiology  2:31 PM I rechecked and updated the patient with results.  We will plan on continued outpatient management.  Referral given to spine.  Patient clearly instructed to minimize her  activities over the next few days.  She is to return for any significant worsening.  At the conclusion of the encounter I discussed the results of all of the tests and the disposition. The questions were answered and return precautions provided. The patient or family acknowledged understanding and was agreeable with the care plan.       Medical Decision Making    History:    Supplemental history from: Documented in chart, if applicable    External Record(s) reviewed: Documented in chart, if applicable.    Work Up:    Chart documentation includes differential considered and any EKGs or imaging independently interpreted by provider, where specified.    In additional to work up documented, I considered the following work up: Documented in chart, if applicable.    External consultation:    Discussion of management with another provider: Documented in chart, if applicable    Complicating factors:    Care impacted by chronic illness: Mental Health    Care affected by social determinants of health: Access to Affordable Health Care    Disposition considerations: Discharge. I prescribed additional prescription strength medication(s) as charted. I considered admission, but discharged patient after significant clinical improvement.      MEDICATIONS GIVEN IN THE EMERGENCY:  Medications   HYDROcodone-acetaminophen (NORCO) 5-325 MG per tablet 1 tablet (1 tablet Oral $Given 7/2/23 1305)       NEW PRESCRIPTIONS STARTED AT TODAY'S ER VISIT  Current Discharge Medication List      START taking these medications    Details   HYDROcodone-acetaminophen (NORCO) 5-325 MG tablet Take 1 tablet by mouth every 8 hours for 3 days  Qty: 10 tablet, Refills: 0      senna-docusate (SENOKOT-S/PERICOLACE) 8.6-50 MG tablet Take 1 tablet by mouth daily  Qty: 10 tablet, Refills: 0                =================================================================    HPI    Patient information was obtained from: Patient     Use of Intrepreter: N/A        Ambar Dutton is a 89 year old female with a pertient medical history of spinal stenosis who presents to the ED for evaluation of back pain.     Per chart review, patient had a CTA abdomen on 11/21/22. Imaging remarkable for Generalized bone demineralization. Moderate compression deformity of T7, severe compression deformity of T12 and mild compression deformity of T8. There are moderate compression deformities of L2-L5. No definite perivertebral   inflammatory stranding to indicate an acute fracture. Diffuse lumbar facet arthropathy. Advanced osteoarthrosis of both shoulders.    Patient reports she wore a new pain of shoes on 6/29/23 and has since been experiencing gradually worsening back pain . The pain shoots down from her back to her waist. Movement provokes her pain. Of note, patient has had back surgery last in 2013. No other concerns or complaints at this time.     REVIEW OF SYSTEMS   Constitutional:  Denies fever, chills  Respiratory:  Denies productive cough or increased work of breathing  Cardiovascular:  Denies chest pain, palpitations  GI:  Denies abdominal pain, nausea, vomiting, or change in bowel or bladder habits   Musculoskeletal: Endorses back pain.   Skin:  Denies rash   Neurologic:  Denies focal weakness  All systems negative except as marked.     PAST MEDICAL HISTORY:  History reviewed. No pertinent past medical history.    PAST SURGICAL HISTORY:  Past Surgical History:   Procedure Laterality Date     OTHER SURGICAL HISTORY  2013    L3-4, L4-5 posterior decompression 2 level         CURRENT MEDICATIONS:    No current facility-administered medications for this encounter.    Current Outpatient Medications:      ascorbic acid, vitamin C, (VITAMIN C) 500 MG tablet, [ASCORBIC ACID, VITAMIN C, (VITAMIN C) 500 MG TABLET] Take 1,000 mg by mouth., Disp: , Rfl:      aspirin 81 MG EC tablet, Take 81 mg by mouth daily, Disp: , Rfl:      calcium carbonate 500 mg, elemental, 1250 (500 Ca) MG tablet  "chewable, Take 500 mg by mouth as needed, Disp: , Rfl:      calcium carbonate-vitamin D3 600 mg(1,500mg) -200 unit per tablet, [CALCIUM CARBONATE-VITAMIN D3 600 MG(1,500MG) -200 UNIT PER TABLET] Take 1 tablet by mouth., Disp: , Rfl:      fish oil-omega-3 fatty acids 1000 MG capsule, Take 1 g by mouth, Disp: , Rfl:      meclizine (ANTIVERT) 25 mg tablet, Take 25 mg by mouth daily as needed , Disp: , Rfl:      methylcellulose (CITRUCEL) 500 MG TABS tablet, , Disp: , Rfl:      Multiple Vitamins-Minerals (PRESERVISION AREDS 2) CAPS, , Disp: , Rfl:      niacin 250 MG tablet, [NIACIN 250 MG TABLET] Take 250 mg by mouth daily with breakfast., Disp: , Rfl:      polyethylene glycol (MIRALAX) 17 gram packet, [POLYETHYLENE GLYCOL (MIRALAX) 17 GRAM PACKET] Take 17 g by mouth daily., Disp: , Rfl:     ALLERGIES:  Allergies   Allergen Reactions     Simvastatin Muscle Pain (Myalgia) and Other (See Comments)     Simvastatin and atorvastatin caused arm tingling and possible muscle discomfort.       FAMILY HISTORY:  History reviewed. No pertinent family history.    SOCIAL HISTORY:   Social History     Socioeconomic History     Marital status:      Spouse name: None     Number of children: None     Years of education: None     Highest education level: None   Tobacco Use     Smoking status: Never     Smokeless tobacco: Never   Vaping Use     Vaping Use: Never used   Substance and Sexual Activity     Alcohol use: Not Currently     Drug use: Not Currently     Sexual activity: Not Currently   Social History Narrative    Widowedx2 lost her  8 months ago (7.20).  She has 4 children who live in [place]. She enjoys golfing.     Former patient of Dr. Ian Ornelas.    Lives at [facility].       VITALS:  Patient Vitals for the past 24 hrs:   BP Temp Temp src Pulse Resp SpO2 Height Weight   07/02/23 1243 (!) 148/93 98.4  F (36.9  C) Temporal 81 16 94 % 1.499 m (4' 11\") 51.7 kg (114 lb)        PHYSICAL EXAM    Constitutional: "  Awake, alert, in mild apparent distress. Elderly woman, younger appearing than stated age.   HENT:  Normocephalic, Atraumatic. Bilateral external ears normal. Oropharynx moist. Nose normal. Neck- Normal range of motion with no guarding, Supple, No stridor.   Eyes:  PERRL, EOMI with no signs of entrapment, Conjunctiva normal, No discharge.   Respiratory:  Normal breath sounds, No respiratory distress, No wheezing.    Cardiovascular:  Normal heart rate, Normal rhythm, No appreciable rubs or gallops.   GI:  Soft, No tenderness, No distension, No palpable masses  Musculoskeletal:   No edema. Good range of motion in all major joints. Mild, diffuse midline upper lumbar and thoracic tenderness, no focal tenderness.   Integument:  Warm, Dry, No erythema, No rash.   Neurologic:  Alert & oriented, Normal motor function, Normal sensory function, No focal deficits noted.   Psychiatric:  Affect normal, Judgment normal, Mood normal.     LAB:  All pertinent labs reviewed and interpreted.       RADIOLOGY:  Reviewed all pertinent imaging. Please see official radiology report.  CT Thoracic Spine w/o Contrast    Result Date: 7/2/2023  EXAM: CT THORACIC SPINE W/O CONTRAST, CT LUMBAR SPINE W/O CONTRAST LOCATION: [facility] DATE: 7/2/2023 INDICATION: Acute back pain, history of spontaneous compression fracture COMPARISON: CT chest abdomen pelvis 11/21/2022. X-ray lumbar spine 06/15/2021. TECHNIQUE: 1) Routine CT Thoracic Spine without IV contrast. Multiplanar reformats. Dose reduction techniques were used. 2) Routine CT Lumbar Spine without IV contrast. Multiplanar reformats. Dose reduction techniques were used. FINDINGS: THORACIC SPINE CT: VERTEBRA: 12 rib-bearing thoracic type vertebra. Diffuse osteopenia. Mild lower thoracic levocurvature. No significant subluxation. Inferior endplate compression fracture at T7 with 60% anterior vertebral height loss is unchanged compared to the prior CT. Inferior endplate  compression fracture at T8 with 60-70% central vertebral height loss has progressed compared to the prior CT on an age-indeterminate basis. Chronic marked central compression deformity at T12 similar to the previous exam. No additional fracture identified. CANAL/FORAMINA: Multilevel thoracic spondylosis with mild multilevel loss of disc height and minor endplate spurring. No CT evidence for high-grade central spinal canal stenosis. Mild to moderate bilateral neural foraminal stenosis at T7-T8 and T8-T9. Mild right neural foraminal stenosis at T10-T11 and mild to moderate bilateral neural foraminal stenosis at T11-T12. PARASPINAL: Mild paraspinal muscular atrophy. Scattered airspace opacities within the posterior segment of the right upper lobe have increased and are presumably inflammatory in nature. Partially calcified left lower lobe pulmonary nodule is unchanged and presumably reflects old granulomatous disease. LUMBAR SPINE CT: VERTEBRA: 5 lumbar type vertebra. Diffuse osteopenia. Broad lumbar dextrocurvature. Chronic compression fractures at L2, L3, L4, and L5 similar to the previous CT exam with moderate to marked central vertebral height loss greatest at L2 and L4. No evidence for progressive vertebral height loss or definite new fracture. Unchanged alignment without significant subluxation. CANAL/FORAMINA: Diffuse lumbar spondylosis. Accentuated disc height throughout the lumbar spine with circumferential annular bulging, endplate spurring, and multilevel facet arthropathy results in moderate trefoil type spinal canal stenosis at L2-L3 and mild to moderate spinal canal stenosis at L3-L4. Mild narrowing of the lateral recesses without central spinal canal stenosis at L4-L5 and L5-S1. Mild to moderate bilateral neural foraminal stenosis at multiple levels from L2 through L5, greatest on the right at L3-L4 and L4-L5. Additional mild to moderate right neural foraminal stenosis at L5-S1. PARASPINAL: Posterior  paraspinal muscular atrophy. Atherosclerotic changes of the aortoiliac vessels.     IMPRESSION: THORACIC SPINE CT: 1.  Inferior endplate compression fracture at T8 with 60-70% central vertebral height loss has progressed compared to the prior CT from 11/21/2022 on an age-indeterminate basis. Correlate with lower thoracic point tenderness. 2.  Chronic compression fractures at T7 and T12 similar to findings on the previous chest CT. 3.  Multilevel thoracic spondylosis as above. 4.  Scattered airspace opacities within the posterior segment of the right upper lobe have increased and are presumably inflammatory in nature. LUMBAR SPINE CT: 1.  Chronic compression fractures from L2 through L5 similar to findings on the previous abdominal CT. No definite new fracture or progressive vertebral height loss identified. 2.  Diffuse lumbar spondylosis resulting in moderate trefoil type spinal canal stenosis at L2-L3 and mild to moderate spinal canal stenosis at L3-L4. 3.  Multilevel neural foraminal stenosis as above.     Lumbar spine CT w/o contrast    Result Date: 7/2/2023  EXAM: CT THORACIC SPINE W/O CONTRAST, CT LUMBAR SPINE W/O CONTRAST LOCATION: [facility] DATE: 7/2/2023 INDICATION: Acute back pain, history of spontaneous compression fracture COMPARISON: CT chest abdomen pelvis 11/21/2022. X-ray lumbar spine 06/15/2021. TECHNIQUE: 1) Routine CT Thoracic Spine without IV contrast. Multiplanar reformats. Dose reduction techniques were used. 2) Routine CT Lumbar Spine without IV contrast. Multiplanar reformats. Dose reduction techniques were used. FINDINGS: THORACIC SPINE CT: VERTEBRA: 12 rib-bearing thoracic type vertebra. Diffuse osteopenia. Mild lower thoracic levocurvature. No significant subluxation. Inferior endplate compression fracture at T7 with 60% anterior vertebral height loss is unchanged compared to the prior CT. Inferior endplate compression fracture at T8 with 60-70% central vertebral  height loss has progressed compared to the prior CT on an age-indeterminate basis. Chronic marked central compression deformity at T12 similar to the previous exam. No additional fracture identified. CANAL/FORAMINA: Multilevel thoracic spondylosis with mild multilevel loss of disc height and minor endplate spurring. No CT evidence for high-grade central spinal canal stenosis. Mild to moderate bilateral neural foraminal stenosis at T7-T8 and T8-T9. Mild right neural foraminal stenosis at T10-T11 and mild to moderate bilateral neural foraminal stenosis at T11-T12. PARASPINAL: Mild paraspinal muscular atrophy. Scattered airspace opacities within the posterior segment of the right upper lobe have increased and are presumably inflammatory in nature. Partially calcified left lower lobe pulmonary nodule is unchanged and presumably reflects old granulomatous disease. LUMBAR SPINE CT: VERTEBRA: 5 lumbar type vertebra. Diffuse osteopenia. Broad lumbar dextrocurvature. Chronic compression fractures at L2, L3, L4, and L5 similar to the previous CT exam with moderate to marked central vertebral height loss greatest at L2 and L4. No evidence for progressive vertebral height loss or definite new fracture. Unchanged alignment without significant subluxation. CANAL/FORAMINA: Diffuse lumbar spondylosis. Accentuated disc height throughout the lumbar spine with circumferential annular bulging, endplate spurring, and multilevel facet arthropathy results in moderate trefoil type spinal canal stenosis at L2-L3 and mild to moderate spinal canal stenosis at L3-L4. Mild narrowing of the lateral recesses without central spinal canal stenosis at L4-L5 and L5-S1. Mild to moderate bilateral neural foraminal stenosis at multiple levels from L2 through L5, greatest on the right at L3-L4 and L4-L5. Additional mild to moderate right neural foraminal stenosis at L5-S1. PARASPINAL: Posterior paraspinal muscular atrophy. Atherosclerotic changes of the  aortoiliac vessels.     IMPRESSION: THORACIC SPINE CT: 1.  Inferior endplate compression fracture at T8 with 60-70% central vertebral height loss has progressed compared to the prior CT from 11/21/2022 on an age-indeterminate basis. Correlate with lower thoracic point tenderness. 2.  Chronic compression fractures at T7 and T12 similar to findings on the previous chest CT. 3.  Multilevel thoracic spondylosis as above. 4.  Scattered airspace opacities within the posterior segment of the right upper lobe have increased and are presumably inflammatory in nature. LUMBAR SPINE CT: 1.  Chronic compression fractures from L2 through L5 similar to findings on the previous abdominal CT. No definite new fracture or progressive vertebral height loss identified. 2.  Diffuse lumbar spondylosis resulting in moderate trefoil type spinal canal stenosis at L2-L3 and mild to moderate spinal canal stenosis at L3-L4. 3.  Multilevel neural foraminal stenosis as above.   I, Viktoriya Salazar, am serving as a scribe to document services personally performed by Berny Mandujano MD, based on my observation and the provider's statements to me. I, Berny Mandujano MD attest that Viktoriya Salazar is acting in a scribe capacity, has observed my performance of the services and has documented them in accordance with my direction.    Berny Mandujano M.D.  Emergency Medicine  [facility] EMERGENCY DEPARTMENT       Berny Mandujano MD  07/02/23 0672

## 2023-07-13 ENCOUNTER — HOSPITAL ENCOUNTER (OUTPATIENT)
Facility: HOSPITAL | Age: 88
Setting detail: OBSERVATION
Discharge: NURSING, REHAB OR LONG-TERM CARE | End: 2023-07-17
Attending: STUDENT IN AN ORGANIZED HEALTH CARE EDUCATION/TRAINING PROGRAM | Admitting: STUDENT IN AN ORGANIZED HEALTH CARE EDUCATION/TRAINING PROGRAM
Payer: MEDICARE

## 2023-07-13 DIAGNOSIS — R19.7 DIARRHEA, UNSPECIFIED TYPE: ICD-10-CM

## 2023-07-13 DIAGNOSIS — I10 BENIGN ESSENTIAL HYPERTENSION: ICD-10-CM

## 2023-07-13 DIAGNOSIS — R53.1 WEAKNESS: ICD-10-CM

## 2023-07-13 DIAGNOSIS — S22.060D CLOSED WEDGE COMPRESSION FRACTURE OF T8 VERTEBRA WITH ROUTINE HEALING: Primary | ICD-10-CM

## 2023-07-13 LAB
ALBUMIN SERPL BCG-MCNC: 4.2 G/DL (ref 3.5–5.2)
ALP SERPL-CCNC: 166 U/L (ref 35–104)
ALT SERPL W P-5'-P-CCNC: 27 U/L (ref 0–50)
ANION GAP SERPL CALCULATED.3IONS-SCNC: 13 MMOL/L (ref 7–15)
AST SERPL W P-5'-P-CCNC: 34 U/L (ref 0–45)
BASOPHILS # BLD AUTO: 0.1 10E3/UL (ref 0–0.2)
BASOPHILS NFR BLD AUTO: 1 %
BILIRUB SERPL-MCNC: 0.3 MG/DL
BUN SERPL-MCNC: 23.7 MG/DL (ref 8–23)
CALCIUM SERPL-MCNC: 10.1 MG/DL (ref 8.2–9.6)
CHLORIDE SERPL-SCNC: 98 MMOL/L (ref 98–107)
CREAT SERPL-MCNC: 0.89 MG/DL (ref 0.51–0.95)
DEPRECATED HCO3 PLAS-SCNC: 24 MMOL/L (ref 22–29)
EOSINOPHIL # BLD AUTO: 0 10E3/UL (ref 0–0.7)
EOSINOPHIL NFR BLD AUTO: 0 %
ERYTHROCYTE [DISTWIDTH] IN BLOOD BY AUTOMATED COUNT: 13.8 % (ref 10–15)
GFR SERPL CREATININE-BSD FRML MDRD: 61 ML/MIN/1.73M2
GLUCOSE SERPL-MCNC: 145 MG/DL (ref 70–99)
HCT VFR BLD AUTO: 39.1 % (ref 35–47)
HGB BLD-MCNC: 12.9 G/DL (ref 11.7–15.7)
IMM GRANULOCYTES # BLD: 0 10E3/UL
IMM GRANULOCYTES NFR BLD: 0 %
LACTATE SERPL-SCNC: 0.8 MMOL/L (ref 0.7–2)
LIPASE SERPL-CCNC: 54 U/L (ref 13–60)
LYMPHOCYTES # BLD AUTO: 1.4 10E3/UL (ref 0.8–5.3)
LYMPHOCYTES NFR BLD AUTO: 15 %
MAGNESIUM SERPL-MCNC: 2 MG/DL (ref 1.7–2.3)
MCH RBC QN AUTO: 30.8 PG (ref 26.5–33)
MCHC RBC AUTO-ENTMCNC: 33 G/DL (ref 31.5–36.5)
MCV RBC AUTO: 93 FL (ref 78–100)
MONOCYTES # BLD AUTO: 0.9 10E3/UL (ref 0–1.3)
MONOCYTES NFR BLD AUTO: 9 %
NEUTROPHILS # BLD AUTO: 7.1 10E3/UL (ref 1.6–8.3)
NEUTROPHILS NFR BLD AUTO: 75 %
NRBC # BLD AUTO: 0 10E3/UL
NRBC BLD AUTO-RTO: 0 /100
PLATELET # BLD AUTO: 309 10E3/UL (ref 150–450)
POTASSIUM SERPL-SCNC: 4.2 MMOL/L (ref 3.4–5.3)
PROT SERPL-MCNC: 7.2 G/DL (ref 6.4–8.3)
RBC # BLD AUTO: 4.19 10E6/UL (ref 3.8–5.2)
SODIUM SERPL-SCNC: 135 MMOL/L (ref 136–145)
WBC # BLD AUTO: 9.5 10E3/UL (ref 4–11)

## 2023-07-13 PROCEDURE — 87086 URINE CULTURE/COLONY COUNT: CPT | Performed by: STUDENT IN AN ORGANIZED HEALTH CARE EDUCATION/TRAINING PROGRAM

## 2023-07-13 PROCEDURE — 87507 IADNA-DNA/RNA PROBE TQ 12-25: CPT | Mod: GZ | Performed by: STUDENT IN AN ORGANIZED HEALTH CARE EDUCATION/TRAINING PROGRAM

## 2023-07-13 PROCEDURE — 36415 COLL VENOUS BLD VENIPUNCTURE: CPT | Performed by: STUDENT IN AN ORGANIZED HEALTH CARE EDUCATION/TRAINING PROGRAM

## 2023-07-13 PROCEDURE — 83690 ASSAY OF LIPASE: CPT | Performed by: STUDENT IN AN ORGANIZED HEALTH CARE EDUCATION/TRAINING PROGRAM

## 2023-07-13 PROCEDURE — 85025 COMPLETE CBC W/AUTO DIFF WBC: CPT | Performed by: STUDENT IN AN ORGANIZED HEALTH CARE EDUCATION/TRAINING PROGRAM

## 2023-07-13 PROCEDURE — 87637 SARSCOV2&INF A&B&RSV AMP PRB: CPT | Performed by: STUDENT IN AN ORGANIZED HEALTH CARE EDUCATION/TRAINING PROGRAM

## 2023-07-13 PROCEDURE — 99285 EMERGENCY DEPT VISIT HI MDM: CPT | Mod: 25

## 2023-07-13 PROCEDURE — 80053 COMPREHEN METABOLIC PANEL: CPT | Performed by: STUDENT IN AN ORGANIZED HEALTH CARE EDUCATION/TRAINING PROGRAM

## 2023-07-13 PROCEDURE — 81001 URINALYSIS AUTO W/SCOPE: CPT | Performed by: STUDENT IN AN ORGANIZED HEALTH CARE EDUCATION/TRAINING PROGRAM

## 2023-07-13 PROCEDURE — 83735 ASSAY OF MAGNESIUM: CPT | Performed by: STUDENT IN AN ORGANIZED HEALTH CARE EDUCATION/TRAINING PROGRAM

## 2023-07-13 PROCEDURE — 258N000003 HC RX IP 258 OP 636: Performed by: STUDENT IN AN ORGANIZED HEALTH CARE EDUCATION/TRAINING PROGRAM

## 2023-07-13 PROCEDURE — G0378 HOSPITAL OBSERVATION PER HR: HCPCS

## 2023-07-13 PROCEDURE — 96361 HYDRATE IV INFUSION ADD-ON: CPT

## 2023-07-13 PROCEDURE — 83605 ASSAY OF LACTIC ACID: CPT | Performed by: STUDENT IN AN ORGANIZED HEALTH CARE EDUCATION/TRAINING PROGRAM

## 2023-07-13 RX ORDER — SODIUM CHLORIDE, SODIUM LACTATE, POTASSIUM CHLORIDE, CALCIUM CHLORIDE 600; 310; 30; 20 MG/100ML; MG/100ML; MG/100ML; MG/100ML
INJECTION, SOLUTION INTRAVENOUS ONCE
Status: COMPLETED | OUTPATIENT
Start: 2023-07-13 | End: 2023-07-14

## 2023-07-13 RX ADMIN — SODIUM CHLORIDE, POTASSIUM CHLORIDE, SODIUM LACTATE AND CALCIUM CHLORIDE 1000 ML: 600; 310; 30; 20 INJECTION, SOLUTION INTRAVENOUS at 22:59

## 2023-07-13 ASSESSMENT — ACTIVITIES OF DAILY LIVING (ADL): ADLS_ACUITY_SCORE: 35

## 2023-07-14 ENCOUNTER — APPOINTMENT (OUTPATIENT)
Dept: PHYSICAL THERAPY | Facility: HOSPITAL | Age: 88
End: 2023-07-14
Attending: HOSPITALIST
Payer: MEDICARE

## 2023-07-14 ENCOUNTER — APPOINTMENT (OUTPATIENT)
Dept: MRI IMAGING | Facility: HOSPITAL | Age: 88
End: 2023-07-14
Attending: NURSE PRACTITIONER
Payer: MEDICARE

## 2023-07-14 PROBLEM — S22.060D CLOSED WEDGE COMPRESSION FRACTURE OF T8 VERTEBRA WITH ROUTINE HEALING: Status: ACTIVE | Noted: 2023-07-14

## 2023-07-14 PROBLEM — R62.7 ADULT FAILURE TO THRIVE: Status: ACTIVE | Noted: 2023-07-14

## 2023-07-14 LAB
ADV 40+41 DNA STL QL NAA+NON-PROBE: NEGATIVE
ALBUMIN UR-MCNC: 10 MG/DL
ANION GAP SERPL CALCULATED.3IONS-SCNC: 10 MMOL/L (ref 7–15)
APPEARANCE UR: CLEAR
ASTRO TYP 1-8 RNA STL QL NAA+NON-PROBE: NEGATIVE
BACTERIA #/AREA URNS HPF: ABNORMAL /HPF
BILIRUB UR QL STRIP: NEGATIVE
BUN SERPL-MCNC: 17.2 MG/DL (ref 8–23)
C CAYETANENSIS DNA STL QL NAA+NON-PROBE: NEGATIVE
CALCIUM SERPL-MCNC: 10.1 MG/DL (ref 8.2–9.6)
CALCIUM, IONIZED MEASURED: 1.18 MMOL/L (ref 1.11–1.3)
CAMPYLOBACTER DNA SPEC NAA+PROBE: NEGATIVE
CHLORIDE SERPL-SCNC: 100 MMOL/L (ref 98–107)
COLOR UR AUTO: ABNORMAL
CREAT SERPL-MCNC: 0.72 MG/DL (ref 0.51–0.95)
CRYPTOSP DNA STL QL NAA+NON-PROBE: NEGATIVE
DEPRECATED HCO3 PLAS-SCNC: 29 MMOL/L (ref 22–29)
E COLI O157 DNA STL QL NAA+NON-PROBE: NORMAL
E HISTOLYT DNA STL QL NAA+NON-PROBE: NEGATIVE
EAEC ASTA GENE ISLT QL NAA+PROBE: NEGATIVE
EC STX1+STX2 GENES STL QL NAA+NON-PROBE: NEGATIVE
EPEC EAE GENE STL QL NAA+NON-PROBE: NEGATIVE
ETEC LTA+ST1A+ST1B TOX ST NAA+NON-PROBE: NEGATIVE
FLUAV RNA SPEC QL NAA+PROBE: NEGATIVE
FLUBV RNA RESP QL NAA+PROBE: NEGATIVE
G LAMBLIA DNA STL QL NAA+NON-PROBE: NEGATIVE
GFR SERPL CREATININE-BSD FRML MDRD: 79 ML/MIN/1.73M2
GLUCOSE SERPL-MCNC: 109 MG/DL (ref 70–99)
GLUCOSE UR STRIP-MCNC: NEGATIVE MG/DL
HGB UR QL STRIP: NEGATIVE
HYALINE CASTS: 5 /LPF
ION CA PH 7.4: 1.16 MMOL/L (ref 1.11–1.3)
KETONES UR STRIP-MCNC: NEGATIVE MG/DL
LEUKOCYTE ESTERASE UR QL STRIP: ABNORMAL
MUCOUS THREADS #/AREA URNS LPF: PRESENT /LPF
NITRATE UR QL: NEGATIVE
NOROVIRUS GI+II RNA STL QL NAA+NON-PROBE: NEGATIVE
P SHIGELLOIDES DNA STL QL NAA+NON-PROBE: NEGATIVE
PH UR STRIP: 6 [PH] (ref 5–7)
PH: 7.37 (ref 7.35–7.45)
POTASSIUM SERPL-SCNC: 4.5 MMOL/L (ref 3.4–5.3)
RBC URINE: 4 /HPF
RSV RNA SPEC NAA+PROBE: NEGATIVE
RVA RNA STL QL NAA+NON-PROBE: NEGATIVE
SALMONELLA SP RPOD STL QL NAA+PROBE: NEGATIVE
SAPO I+II+IV+V RNA STL QL NAA+NON-PROBE: NEGATIVE
SARS-COV-2 RNA RESP QL NAA+PROBE: NEGATIVE
SHIGELLA SP+EIEC IPAH ST NAA+NON-PROBE: NEGATIVE
SODIUM SERPL-SCNC: 139 MMOL/L (ref 136–145)
SP GR UR STRIP: 1.01 (ref 1–1.03)
SQUAMOUS EPITHELIAL: <1 /HPF
UROBILINOGEN UR STRIP-MCNC: <2 MG/DL
V CHOLERAE DNA SPEC QL NAA+PROBE: NEGATIVE
VIBRIO DNA SPEC NAA+PROBE: NEGATIVE
WBC CLUMPS #/AREA URNS HPF: PRESENT /HPF
WBC URINE: 44 /HPF
Y ENTEROCOL DNA STL QL NAA+PROBE: NEGATIVE

## 2023-07-14 PROCEDURE — 72146 MRI CHEST SPINE W/O DYE: CPT | Mod: MG

## 2023-07-14 PROCEDURE — 80048 BASIC METABOLIC PNL TOTAL CA: CPT | Performed by: HOSPITALIST

## 2023-07-14 PROCEDURE — 258N000003 HC RX IP 258 OP 636: Performed by: STUDENT IN AN ORGANIZED HEALTH CARE EDUCATION/TRAINING PROGRAM

## 2023-07-14 PROCEDURE — 97162 PT EVAL MOD COMPLEX 30 MIN: CPT | Mod: GP

## 2023-07-14 PROCEDURE — G0378 HOSPITAL OBSERVATION PER HR: HCPCS

## 2023-07-14 PROCEDURE — 250N000013 HC RX MED GY IP 250 OP 250 PS 637: Performed by: HOSPITALIST

## 2023-07-14 PROCEDURE — 96361 HYDRATE IV INFUSION ADD-ON: CPT

## 2023-07-14 PROCEDURE — 82330 ASSAY OF CALCIUM: CPT | Performed by: HOSPITALIST

## 2023-07-14 PROCEDURE — 250N000011 HC RX IP 250 OP 636: Mod: JZ | Performed by: HOSPITALIST

## 2023-07-14 PROCEDURE — 96375 TX/PRO/DX INJ NEW DRUG ADDON: CPT

## 2023-07-14 PROCEDURE — 96366 THER/PROPH/DIAG IV INF ADDON: CPT

## 2023-07-14 PROCEDURE — 36415 COLL VENOUS BLD VENIPUNCTURE: CPT | Performed by: HOSPITALIST

## 2023-07-14 PROCEDURE — 97530 THERAPEUTIC ACTIVITIES: CPT | Mod: GP

## 2023-07-14 PROCEDURE — 96365 THER/PROPH/DIAG IV INF INIT: CPT

## 2023-07-14 PROCEDURE — 99222 1ST HOSP IP/OBS MODERATE 55: CPT | Performed by: HOSPITALIST

## 2023-07-14 PROCEDURE — 99232 SBSQ HOSP IP/OBS MODERATE 35: CPT | Performed by: HOSPITALIST

## 2023-07-14 RX ORDER — LIDOCAINE 4 G/G
1 PATCH TOPICAL
Status: DISCONTINUED | OUTPATIENT
Start: 2023-07-14 | End: 2023-07-17 | Stop reason: HOSPADM

## 2023-07-14 RX ORDER — ONDANSETRON 2 MG/ML
4 INJECTION INTRAMUSCULAR; INTRAVENOUS EVERY 6 HOURS PRN
Status: DISCONTINUED | OUTPATIENT
Start: 2023-07-14 | End: 2023-07-17 | Stop reason: HOSPADM

## 2023-07-14 RX ORDER — HYDROMORPHONE HYDROCHLORIDE 2 MG/1
2 TABLET ORAL EVERY 4 HOURS PRN
Status: DISCONTINUED | OUTPATIENT
Start: 2023-07-14 | End: 2023-07-17 | Stop reason: HOSPADM

## 2023-07-14 RX ORDER — ACETAMINOPHEN 325 MG/1
325 TABLET ORAL EVERY 8 HOURS PRN
Status: DISCONTINUED | OUTPATIENT
Start: 2023-07-14 | End: 2023-07-17 | Stop reason: HOSPADM

## 2023-07-14 RX ORDER — NALOXONE HYDROCHLORIDE 0.4 MG/ML
0.4 INJECTION, SOLUTION INTRAMUSCULAR; INTRAVENOUS; SUBCUTANEOUS
Status: DISCONTINUED | OUTPATIENT
Start: 2023-07-14 | End: 2023-07-17 | Stop reason: HOSPADM

## 2023-07-14 RX ORDER — NALOXONE HYDROCHLORIDE 0.4 MG/ML
0.2 INJECTION, SOLUTION INTRAMUSCULAR; INTRAVENOUS; SUBCUTANEOUS
Status: DISCONTINUED | OUTPATIENT
Start: 2023-07-14 | End: 2023-07-17 | Stop reason: HOSPADM

## 2023-07-14 RX ORDER — ACETAMINOPHEN 650 MG/1
650 SUPPOSITORY RECTAL EVERY 6 HOURS PRN
Status: DISCONTINUED | OUTPATIENT
Start: 2023-07-14 | End: 2023-07-14

## 2023-07-14 RX ORDER — ACETAMINOPHEN 325 MG/1
650 TABLET ORAL EVERY 6 HOURS PRN
Status: DISCONTINUED | OUTPATIENT
Start: 2023-07-14 | End: 2023-07-14

## 2023-07-14 RX ORDER — CEFTRIAXONE 1 G/1
1 INJECTION, POWDER, FOR SOLUTION INTRAMUSCULAR; INTRAVENOUS EVERY 24 HOURS
Status: DISCONTINUED | OUTPATIENT
Start: 2023-07-14 | End: 2023-07-16

## 2023-07-14 RX ORDER — METHOCARBAMOL 500 MG/1
500 TABLET, FILM COATED ORAL EVERY 6 HOURS PRN
Status: DISCONTINUED | OUTPATIENT
Start: 2023-07-14 | End: 2023-07-17 | Stop reason: HOSPADM

## 2023-07-14 RX ORDER — CALCIUM POLYCARBOPHIL 625 MG 625 MG/1
2 TABLET ORAL DAILY
COMMUNITY

## 2023-07-14 RX ORDER — LOPERAMIDE HCL 2 MG
2 CAPSULE ORAL 4 TIMES DAILY PRN
Status: DISCONTINUED | OUTPATIENT
Start: 2023-07-14 | End: 2023-07-17 | Stop reason: HOSPADM

## 2023-07-14 RX ORDER — AMLODIPINE BESYLATE 5 MG/1
5 TABLET ORAL DAILY
Status: DISCONTINUED | OUTPATIENT
Start: 2023-07-14 | End: 2023-07-17 | Stop reason: HOSPADM

## 2023-07-14 RX ORDER — ONDANSETRON 4 MG/1
4 TABLET, ORALLY DISINTEGRATING ORAL EVERY 6 HOURS PRN
Status: DISCONTINUED | OUTPATIENT
Start: 2023-07-14 | End: 2023-07-17 | Stop reason: HOSPADM

## 2023-07-14 RX ORDER — POLYETHYLENE GLYCOL 3350 17 G/17G
17 POWDER, FOR SOLUTION ORAL DAILY PRN
Status: DISCONTINUED | OUTPATIENT
Start: 2023-07-14 | End: 2023-07-17 | Stop reason: HOSPADM

## 2023-07-14 RX ORDER — ACETAMINOPHEN 325 MG/1
975 TABLET ORAL EVERY 8 HOURS
Status: DISCONTINUED | OUTPATIENT
Start: 2023-07-14 | End: 2023-07-17 | Stop reason: HOSPADM

## 2023-07-14 RX ORDER — AMOXICILLIN 250 MG
1 CAPSULE ORAL DAILY PRN
COMMUNITY

## 2023-07-14 RX ORDER — HYDRALAZINE HYDROCHLORIDE 20 MG/ML
10 INJECTION INTRAMUSCULAR; INTRAVENOUS EVERY 6 HOURS PRN
Status: DISCONTINUED | OUTPATIENT
Start: 2023-07-14 | End: 2023-07-14

## 2023-07-14 RX ORDER — HYDRALAZINE HYDROCHLORIDE 20 MG/ML
20 INJECTION INTRAMUSCULAR; INTRAVENOUS EVERY 6 HOURS PRN
Status: DISCONTINUED | OUTPATIENT
Start: 2023-07-14 | End: 2023-07-17 | Stop reason: HOSPADM

## 2023-07-14 RX ORDER — MECLIZINE HYDROCHLORIDE 25 MG/1
25 TABLET ORAL DAILY PRN
Status: DISCONTINUED | OUTPATIENT
Start: 2023-07-14 | End: 2023-07-17 | Stop reason: HOSPADM

## 2023-07-14 RX ADMIN — ACETAMINOPHEN 975 MG: 325 TABLET ORAL at 14:55

## 2023-07-14 RX ADMIN — METHYLCELLULOSE 500 MG: 500 TABLET ORAL at 16:26

## 2023-07-14 RX ADMIN — LIDOCAINE 1 PATCH: 4 PATCH TOPICAL at 08:13

## 2023-07-14 RX ADMIN — SODIUM CHLORIDE, POTASSIUM CHLORIDE, SODIUM LACTATE AND CALCIUM CHLORIDE: 600; 310; 30; 20 INJECTION, SOLUTION INTRAVENOUS at 06:05

## 2023-07-14 RX ADMIN — HYDRALAZINE HYDROCHLORIDE 10 MG: 20 INJECTION INTRAMUSCULAR; INTRAVENOUS at 13:03

## 2023-07-14 RX ADMIN — ACETAMINOPHEN 975 MG: 325 TABLET ORAL at 22:26

## 2023-07-14 RX ADMIN — AMLODIPINE BESYLATE 5 MG: 5 TABLET ORAL at 16:58

## 2023-07-14 RX ADMIN — CEFTRIAXONE SODIUM 1 G: 1 INJECTION, POWDER, FOR SOLUTION INTRAMUSCULAR; INTRAVENOUS at 19:04

## 2023-07-14 RX ADMIN — ACETAMINOPHEN 975 MG: 325 TABLET ORAL at 08:11

## 2023-07-14 ASSESSMENT — ACTIVITIES OF DAILY LIVING (ADL)
ADLS_ACUITY_SCORE: 35
ADLS_ACUITY_SCORE: 39
ADLS_ACUITY_SCORE: 39
ADLS_ACUITY_SCORE: 35
ADLS_ACUITY_SCORE: 35
DEPENDENT_IADLS:: CLEANING;LAUNDRY;SHOPPING;TRANSPORTATION
ADLS_ACUITY_SCORE: 35

## 2023-07-14 NOTE — H&P
[facility]    History and Physical - Hospitalist Service       Date of Admission:  7/13/2023    Assessment & Plan      Ambar Dutton is a 89 year old female admitted on 7/13/2023. She was brought to the ED for evaluation of ongoing back pain, generalized weakness, diarrhea    #Failure to thrive  -Lives in senior independent facility and uses walker for ambulation, no recent falls  -Exacerbated by back pain, now worsen by diarrhea with decreased appetite  -SW/PT/OT evaluation and treatment, will need placement    #Acute back pain  -Scheduled acetaminophen and lidocaine patch  -As needed methocarbamol and hydromorphone    #T8 compression fracture  #Pathologic compression fractures of T7, T12, L2-L5  #Moderate L2-3 trefoil type central canal stenosis  #Mild-moderate L3-4 stenosis  #Osteoporosis  -Neurosurgery service consulted for further recommendations regarding a brace    #Diarrhea  -Secondary to stool softeners  -Supportive management    #Hyponatremia, mild  -Secondary to decreased oral intake     #Hypercalcemia  -Secondary to mild dehydration and oral supplements  -Treated with IV hydration  -Check ionized calcium    #Prerenal azotemia  -Treated with IV hydration  -Recheck renal function  -Avoid nephrotoxins    #Drug-induced platelet defect  -On PTA aspirin  -Monitor for bleeding         Diet: Combination Diet Low Saturated Fat Na <2400mg Diet    DVT Prophylaxis: Ambulate every shift  Cedeno Catheter: Not present  Lines: None     Cardiac Monitoring: None  Code Status: Full Code        Disposition Plan      Expected Discharge Date: 07/14/2023                  Patricio Chapman MD  Hospitalist Service  [facility]  Securely message with [employer] (more info)  Text page via [employer] Paging/Directory     ______________________________________________________________________    Chief Complaint   Weakness, diarrhea    History is obtained from the patient, electronic health  record and emergency department physician    History of Present Illness   Ambar Dutton is a 89 year old female who was brought to the ED for evaluation of above chief complaint.  Patient was evaluated in the ED on 7/2/2023 for 3 days of mid back pain that started suddenly after waking up.  She denies falls or trauma and uses a roller walker at home for ambulation.  CT showed a progressed T8 compression fracture when compared to 11/21/2022.  Patient was given referral to spine specialist and has an upcoming appointment for 7/17/2023.  She has been taking MiraLAX, Dorothy-Colace, Citrucel in addition to fish oil and niacin.  Patient reports more than 10 watery nonbloody bowel movements at home.  She felt bloated with decreased appetite and some nausea but no vomiting.  She felt as though anytime she sat on the toilet to urinate she will have some liquid stool come out.      Past Medical History    No past medical history on file.    Past Surgical History   Past Surgical History:   Procedure Laterality Date     OTHER SURGICAL HISTORY  2013    L3-4, L4-5 posterior decompression 2 level       Prior to Admission Medications   Prior to Admission Medications   Prescriptions Last Dose Informant Patient Reported? Taking?   Multiple Vitamins-Minerals (PRESERVISION AREDS 2) CAPS   Yes No   ascorbic acid, vitamin C, (VITAMIN C) 500 MG tablet   Yes No   Sig: [ASCORBIC ACID, VITAMIN C, (VITAMIN C) 500 MG TABLET] Take 1,000 mg by mouth.   aspirin 81 MG EC tablet Past Week  Yes Yes   Sig: Take 81 mg by mouth daily   calcium carbonate 500 mg, elemental, 1250 (500 Ca) MG tablet chewable   Yes No   Sig: Take 500 mg by mouth as needed   calcium carbonate-vitamin D3 600 mg(1,500mg) -200 unit per tablet   Yes No   Sig: [CALCIUM CARBONATE-VITAMIN D3 600 MG(1,500MG) -200 UNIT PER TABLET] Take 1 tablet by mouth.   fish oil-omega-3 fatty acids 1000 MG capsule   Yes No   Sig: Take 1 g by mouth   meclizine (ANTIVERT) 25 mg tablet   Yes No    Sig: Take 25 mg by mouth daily as needed    methylcellulose (CITRUCEL) 500 MG TABS tablet   Yes No   niacin 250 MG tablet   Yes No   Sig: [NIACIN 250 MG TABLET] Take 250 mg by mouth daily with breakfast.   polyethylene glycol (MIRALAX) 17 gram packet   Yes No   Sig: [POLYETHYLENE GLYCOL (MIRALAX) 17 GRAM PACKET] Take 17 g by mouth daily.   senna-docusate (SENOKOT-S/PERICOLACE) 8.6-50 MG tablet   No No   Sig: Take 1 tablet by mouth daily      Facility-Administered Medications: None           Physical Exam   Vital Signs: Temp: 98.7  F (37.1  C) Temp src: Temporal BP: (!) 149/72 Pulse: 85   Resp: 16 SpO2: 94 % O2 Device: None (Room air)    Weight: 110 lbs 0 oz    Constitutional: awake and alert  Respiratory: no increased work of breathing, good air exchange and clear to auscultation  Cardiovascular: regular rate and rhythm and normal S1 and S2  GI: normal bowel sounds, soft and non-tender  Skin: no bruising or bleeding  Musculoskeletal: no lower extremity pitting edema present  Neurologic: Mental Status Exam:  Level of Alertness:   awake  Orientation:   person, place, time  Motor Exam:  moves all extremities well and symmetrically    Medical Decision Making       MANAGEMENT DISCUSSED with the following over the past 24 hours: Patient       Data     I have personally reviewed the following data over the past 24 hrs:    9.5  \   12.9   / 309     135 (L) 98 23.7 (H) /  145 (H)   4.2 24 0.89 \       ALT: 27 AST: 34 AP: 166 (H) TBILI: 0.3   ALB: 4.2 TOT PROTEIN: 7.2 LIPASE: 54       Procal: N/A CRP: N/A Lactic Acid: 0.8         Imaging results reviewed over the past 24 hrs:   No results found for this or any previous visit (from the past 24 hour(s)).

## 2023-07-14 NOTE — CONSULTS
"NEUROSURGERY CONSULTATION NOTE    Ambar Dutton   [address]  [address]  [facility] [address]  89 year old female  Admission Date/Time: 7/13/2023  9:44 PM  Primary Care Provider: Mary Barnes  [facility] Attending Physician: Ez Vaughn MD    Neurosurgery was asked to see this patient by Ez Vaughn MD for evaluation of T8 fracture.     PROBLEM LIST:  Principal Problem:    Adult failure to thrive  Active Problems:    Weakness    Diarrhea, unspecified type    Closed wedge compression fracture of T8 vertebra with routine healing       Neurosurgery Attending: The patient's clinical examination, laboratory data, and plan was discussed with Dr Hale    CONSULTATION ASSESSMENT AND PLAN:    89yo patient hx untreated severe osteoporosis who presented to [facility] ED on 7/13/23 for evaluation of 1 day of diarrhea and 2wks of atraumatic mid back pain.     We reviewed her thoracolumbar CT scan results. She has chronic multilevel thoracolumbar compression fractures. She has worsened height loss of T8 compression fracture compared to previous CT chest, abdomen and pelvis from 2022. The age of the additional height loss is indeterminate, however. Given that her pain is \"all over\" encompassing the entirety of her chest wall. I would recommend a thoracic MRI for further evaluation of fracture acuity. If this is an acute on chronic fracture, I would likely add an off the shelf TLSO brace and she would need upright xr, outpatient follow up. If no acute height loss, I would recommend PT/OT and further evaluation of pain generators by other involved teams with likely referral to the spine center for ongoing back pain treatment. Ambar would like to consider and discuss potential MRI with her daughter (who is her medical decision maker) once she gets here today.    Given that Ambar's back/chest pain started two weeks ago, she has no red flag symptoms, and she has been going about her normal functions for that " "duration of time, I do not feel strongly about adding bedrest orders for this unknown age fracture. I will add orders for the thoracic MRI and patient can discuss further with her family. She will also need to see her primary care provider to start prescription medication treatment of her severe osteoporosis, including a new dexa scan if indicated (last one was in 2020).       HPI:    89yo patient hx severe osteoporosis (last dexa 2020) who presented to [facility] ED on 7/13/23 for evaluation of diarrhea and back pain. Pain states she woke up two weeks ago with \"pain all over\" her anterior and posterior chest and back. She denies any injury, trauma. Felt like she had been \"hit by a truck\". The pain is worse with lifting, bending, and transitions from sitting to standing or sitting to lying down. She tried to wait it out. Was still able to get around with use of her walker at her independent living, but the pain did not improve. She wasn't sure what to try. She then started having diarrhea yesterday and was brought in for further evaluation of both concerns last night. Since being in the hospital, she has taken tylenol and used a lidocaine patch with some relief. She denies any neck, arm, or leg pain, numbness, tingling, weakness, change in bowel or bladder control.     She takes calcium/vit D but no osteoporosis agents    No past medical history on file.  Past Surgical History:   Procedure Laterality Date     OTHER SURGICAL HISTORY  2013    L3-4, L4-5 posterior decompression 2 level       REVIEW OF SYSTEMS:   negative    MEDICATIONS:  Current Outpatient Medications   Medication Sig Dispense Refill     ascorbic acid, vitamin C, (VITAMIN C) 500 MG tablet Take 1,000 mg by mouth daily       aspirin 81 MG EC tablet Take 81 mg by mouth daily       calcium carbonate 500 mg, elemental, 1250 (500 Ca) MG tablet chewable Take 500 mg by mouth as needed       calcium carbonate-vitamin D3 600 mg(1,500mg) -200 unit per tablet Take " 1 tablet by mouth daily       calcium polycarbophil (FIBERCON) 625 MG tablet Take 2 tablets by mouth daily       fish oil-omega-3 fatty acids 1000 MG capsule Take 1 g by mouth daily       meclizine (ANTIVERT) 25 mg tablet Take 25 mg by mouth daily as needed        methylcellulose (CITRUCEL) 500 MG TABS tablet Take 500 mg by mouth daily       Multiple Vitamins-Minerals (PRESERVISION AREDS 2) CAPS Take 2 capsules by mouth daily       niacin 250 MG tablet [NIACIN 250 MG TABLET] Take 250 mg by mouth daily with breakfast.       polyethylene glycol (MIRALAX) 17 gram packet Take 17 g by mouth daily as needed for constipation       senna-docusate (SENOKOT-S/PERICOLACE) 8.6-50 MG tablet Take 1 tablet by mouth daily as needed for constipation           ALLERGIES/SENSITIVITIES:     Allergies   Allergen Reactions     Simvastatin Muscle Pain (Myalgia)     Simvastatin and atorvastatin caused arm tingling and possible muscle discomfort.       PERTINENT SOCIAL HISTORY: per below  Social History     Socioeconomic History     Marital status:    Tobacco Use     Smoking status: Never     Smokeless tobacco: Never   Vaping Use     Vaping Use: Never used   Substance and Sexual Activity     Alcohol use: Not Currently     Drug use: Not Currently     Sexual activity: Not Currently   Social History Narrative    Widowedx2 lost her  8 months ago (7.20).  She has 4 children who live in [place]. She enjoys golfing.     Former patient of Dr. Ian Ornelas.    Lives at [facility].         FAMILY HISTORY:  No family history on file.     PHYSICAL EXAM:   Constitutional: BP (!) 153/68   Pulse 82   Temp 98.4  F (36.9  C) (Oral)   Resp 20   Ht 5' (1.524 m)   Wt 110 lb (49.9 kg)   SpO2 92%   BMI 21.48 kg/m       Mental Status: A & O in no acute distress.  Affect is appropriate.  Speech is fluent.  Recent and remote memory are intact.  Attention span and concentration are normal.     Cranial Nerves: CN1: grossly intact per  patient recall. CN2: No funduscopic exam performed. CN3,4 & 6: Pupillary light response, lateral and vertical gaze normal.  No nystagmus.  Visual fields are full to confrontation. CN5: Intact to touch CN7: No facial weakness, smile, facial symmetry intact. CN8: Intact to spoken voice. CN9&10: Gag reflex, uvula midline, palate rises with phonation. CN11: Shoulder shrug 5/5 intact bilaterally. CN12: Tongue midline and moves freely from side to side.     Motor: No pronator drift of upper extremity. Normal bulk and tone all muscle groups of upper and lower extremities.    Strength:   5/5 throughout both upper and lower extremities     Sensory: Sensation intact bilaterally to light touch throughout upper and lower extremities     Coordination; finger to nose, heel to shin, rapid alternating movements smooth and rhythmic    Gait was deferred     Reflexes; supinator, biceps, triceps, knee/ ankle jerk 1+. No hoffmans/babinski/ clonus.    IMAGING:  I personally reviewed all radiographic images               (non critical care) I spent more than 45 minutes in this apt, examining the pt, reviewing the scans, reviewing notes from chart, discussing treatment options with risks and benefits and coordinating care. >50 % clinic time was spent in face to face counseling and coordinating care    Jo Smyth [facility]  O. [phone]

## 2023-07-14 NOTE — PROGRESS NOTES
"   07/14/23 0837   Appointment Info   Signing Clinician's Name / Credentials (PT) Ko Gunn, PT, DPT   Rehab Comments (PT) Patient left lying supine with HOB elevated in ED stretcher, needs within reach, RN aware of  thus limited PT eval.   Quick Adds   Quick Adds Certification   Living Environment   People in Home alone   Current Living Arrangements independent living facility   Home Accessibility no concerns  (elevator)   Transportation Anticipated family or friend will provide   Living Environment Comments Bathroom: tub/shower, grab bar, shower seat available, fixed shower head;   Self-Care   Equipment Currently Used at Home cane, straight;walker, rolling  (4WW, rarely uses SPC)   Fall history within last six months no  (reports last fall was last Thanksgiving when she had to do a big step at her son's house)   Activity/Exercise/Self-Care Comment Patient states she can do most of her ADL herself but does require some assist at times. Typically eats [employer] meals. Dtr assists with transportation and occasionally with showering. Has 4 children that live in [place]. Patient and dtr are starting to look into long term in the [place] area but concerned regarding financial implications. Patient feels she needs more help moving forward. Patient states she needs help with /making the bed, medication management (patient states she is \"pretty good at it\" - fills her pill box weekly). Patient is typically indep with 4WW. Patient is not on home oxygen. Sleeps in an adjustable bed.   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, failure to thrive, diarrhea   Existing Precautions/Restrictions fall;spinal   Pain Assessment   Patient Currently " in Pain   (does not report pain)   Range of Motion (ROM)   Range of Motion ROM deficits secondary to weakness   Strength (Manual Muscle Testing)   Strength (Manual Muscle Testing) Deficits observed during functional mobility   Bed Mobility   Bed Mobility supine-sit;sit-supine   Supine-Sit [place] (Bed Mobility) minimum assist (75% patient effort);verbal cues   Sit-Supine [place] (Bed Mobility) contact guard;verbal cues   Assistive Device (Bed Mobility)   (HOB elevated to come to sit)   Transfers   Transfers bed-chair transfer   Bed-Chair Transfer   Assistive Device (Bed-Chair Transfers) walker, front-wheeled  (elevated stretcher height)   Bed-Chair [place] (Transfers) contact guard;verbal cues;nonverbal cues (demo/gesture)   Gait/Stairs (Locomotion)   [place] Level (Gait) contact guard;verbal cues   Assistive Device (Gait) walker, front-wheeled   Distance in Feet bed<>chair   Sensory Examination   Sensory Perception Comments reports newer tingling in Right hand fingertips   Clinical Impression   Criteria for Skilled Therapeutic Intervention Yes, treatment indicated   PT Diagnosis (PT) impaired funcitonal mobility   Influenced by the following impairments impaired balance, impaired strength, impaired activity tolerance, impaired posture, vitals signs   Functional limitations due to impairments impaired bed mobility, impaired transfers, impaired gait   Clinical Presentation (PT Evaluation Complexity) Unstable/Unpredictable   Clinical Presentation Rationale clinical judgement, current medical presentation   Clinical Decision Making (Complexity) moderate complexity   Planned Therapy Interventions (PT) balance training;bed mobility training;gait training;home exercise program;neuromuscular re-education;patient/family education;strengthening;transfer training;progressive activity/exercise   Anticipated Equipment Needs at Discharge (PT)   (has 4WW)   Risk & Benefits of therapy have been explained  evaluation/treatment results reviewed;care plan/treatment goals reviewed;participants voiced agreement with care plan;participants included;patient   PT Total Evaluation Time   PT Eval, Moderate Complexity Minutes (27281) 15   Therapy Certification   Start of care date 07/14/23   Certification date from 07/14/23   Certification date to 07/21/23   Medical Diagnosis failure to thrive   Physical Therapy Goals   PT Frequency Daily   PT Predicted Duration/Target Date for Goal Attainment 07/21/23   PT Goals Bed Mobility;Transfers;Gait   PT: Bed Mobility Modified independent;Supine to/from sit;Within precautions   PT: Transfers Modified independent;Sit to/from stand;Bed to/from chair;Assistive device   PT: Gait Modified independent;Rolling walker;100 feet  (4WW)   Interventions   Interventions Quick Adds Therapeutic Activity   Therapeutic Activity   Therapeutic Activities: dynamic activities to improve functional performance Minutes (48026) 10   Treatment Detail/Skilled Intervention Patient discussed her concerns regarding recent decreased functional independence/mobility/ADL, PT discussed potential options for increasing assistance if she is unable to secure an MCC near her children. Discussed high BP and some potential causes, such as anxiety - as patient reports her BP is always high when she is at the doctors. Discussed that high BP is why PT was limiting mobility at this time - patient concerned - as to her knowledge her BP has never been that high. Patient able to perform second transfer with CGA and verbal cues; however, patient demonstrated good hand placement throughout session.   PT Discharge Planning   PT Plan progress mobility with FWW vs 4WW (uses at home), introduce spinal precautions/log roll, progress general independence for home with intermittent family assist   PT Discharge Recommendation (DC Rec) Transitional Care Facility   PT Rationale for DC Rec Patient's  during eval/treat thus limited  mobility evaluation performed. Patient would currently require continued medical management and therapies at TCU prior to return home to indep living; however, when patient is medically stable, she may be able to transition home with intermittent family assist and Home PT.   PT Brief overview of current status supine>sit [place], sit>supine CGA, bed<>chair transfer FWW CGA, did not ambulate due to .   Total Session Time   Timed Code Treatment Minutes 10   Total Session Time (sum of timed and untimed services) 25       [facility]  OUTPATIENT PHYSICAL THERAPY EVALUATION  PLAN OF TREATMENT FOR OUTPATIENT REHABILITATION  (COMPLETE FOR INITIAL CLAIMS ONLY)  Patient's Last Name, First Name, M.I.  YOB: 1932  Ambar Dutton  VERONIQUE                        Provider's Name  [facility] Medical Record No.  [id number]                             Onset Date:  07/13/23   Start of Care Date:  07/14/23   Type:     _X_PT   ___OT   ___SLP Medical Diagnosis:  failure to thrive              PT Diagnosis:  impaired funcitonal mobility Visits from SOC:  1     See note for plan of treatment, functional goals and certification details    I CERTIFY THE NEED FOR THESE SERVICES FURNISHED UNDER        THIS PLAN OF TREATMENT AND WHILE UNDER MY CARE     (Physician co-signature of this document indicates review and certification of the therapy plan).

## 2023-07-14 NOTE — ED PROVIDER NOTES
Emergency Department Encounter         FINAL IMPRESSION:  Diarrhea, weakness        ED COURSE AND MEDICAL DECISION MAKING       ED Course as of 07/14/23 0024   u Jul 13, 2023   2236 Patient is a 89-year-old female here from home with 24 hours of feeling unwell.  Recent diagnosis of thoracic compression fracture at the beginning the month and seems that she is slowly declined since the diagnosis.  Started noticing increased more than 10 to episodes of nonbloody nonmelanotic diarrhea began today.  Associate with increasing weakness, decreased p.o. and decreased appetite.  Patient at this point lives in a senior apartment complex.  Family has been discussing assisted living because of her slow decline since the beginning the month after her fracture.  At this point family is concerned that due to her weakness, she is unable to take care of herself.  Patient states she has had difficulty walking and is concerned about her ability to do activities of daily living.  On arrival here her vitals are stable.  She looks well clinically.  Looks tired.  Heart and lungs normal.  Abdomen benign.  No leg swelling.  Basic labs out of triage are unremarkable.  Because of generalized weakness, we will obtain COVID and flu, stool, as well as add on a lactate and lipase.       -Plan for admission.  Discussed case with hospitalist.  Labs reviewed.  Lactate normal.                   Medical Decision Making    History:    Supplemental history from: Documented in chart, if applicable    External Record(s) reviewed: Documented in chart, if applicable.    Work Up:    Chart documentation includes differential considered and any EKGs or imaging independently interpreted by provider, where specified.    In additional to work up documented, I considered the following work up: Documented in chart, if applicable.    External consultation:    Discussion of management with another provider: Documented in chart, if applicable    Complicating  factors:    Care impacted by chronic illness: N/A    Care affected by social determinants of health: N/A    Disposition considerations: Admit.                      Critical Care     Performed by: Raymond Dolan or    Authorized by: Raymond Dolan  Total critical care time:  minutes  Critical care was necessary to treat or prevent imminent or life-threatening deterioration of the following conditions:   Critical care was time spent personally by me on the following activities: development of treatment plan with patient or surrogate, discussions with consultants, examination of patient, evaluation of patient's response to treatment, obtaining history from patient or surrogate, ordering and performing treatments and interventions, ordering and review of laboratory studies, ordering and review of radiographic studies, re-evaluation of patient's condition and monitoring for potential decompensation.  Critical care time was exclusive of separately billable procedures and treating other patients.'    At the conclusion of the encounter I discussed the results of all the tests and the disposition. The questions were answered. The patient or family acknowledged understanding and was agreeable with the care plan.                  MEDICATIONS GIVEN IN THE EMERGENCY DEPARTMENT:  Medications   lactated ringers infusion (has no administration in time range)   lactated ringers BOLUS 1,000 mL (1,000 mLs Intravenous $New Bag 7/13/23 3400)       NEW PRESCRIPTIONS STARTED AT TODAY'S ED VISIT:  New Prescriptions    No medications on file       HPI     89-year-old female here with her daughter with concerns of increased nonbloody nonmelanotic diarrhea that started last 24 to 48 hours, increased weakness and difficulty doing ADLs.  Sounds like patient had slow decline since beginning the month after thoracic compression fracture.  No fevers or vomiting.  Decreased p.o. and appetite.  No chest pain or trouble breathing.  No significant abdominal pain.   No dysuria        REVIEW OF SYSTEMS:  Review of Systems   Constitutional: Fatigue, decreased appetite  HEENT: Negative runny nose, sore throat, ear pain, neck pain  Respiratory: Negative for shortness of breath, cough, congestion  Cardiovascular: Negative for chest pain, leg edema  Gastrointestinal: Diarrhea.  No melena  Genitourinary: Negative for dysuria and hematuria.   Integument: Negative for rash, skin breakdown  Neurological: Negative for paresthesias, weakness, headache.  Musculoskeletal: Negative for joint pain, joint swelling      All other systems reviewed and are negative.          MEDICAL HISTORY     No past medical history on file.    Past Surgical History:   Procedure Laterality Date     OTHER SURGICAL HISTORY  2013    L3-4, L4-5 posterior decompression 2 level       Social History     Tobacco Use     Smoking status: Never     Smokeless tobacco: Never   Vaping Use     Vaping Use: Never used   Substance Use Topics     Alcohol use: Not Currently     Drug use: Not Currently       ascorbic acid, vitamin C, (VITAMIN C) 500 MG tablet  aspirin 81 MG EC tablet  calcium carbonate 500 mg, elemental, 1250 (500 Ca) MG tablet chewable  calcium carbonate-vitamin D3 600 mg(1,500mg) -200 unit per tablet  fish oil-omega-3 fatty acids 1000 MG capsule  meclizine (ANTIVERT) 25 mg tablet  methylcellulose (CITRUCEL) 500 MG TABS tablet  Multiple Vitamins-Minerals (PRESERVISION AREDS 2) CAPS  niacin 250 MG tablet  polyethylene glycol (MIRALAX) 17 gram packet  senna-docusate (SENOKOT-S/PERICOLACE) 8.6-50 MG tablet            PHYSICAL EXAM     BP (!) 149/72   Pulse 85   Temp 98.7  F (37.1  C) (Temporal)   Resp 16   Ht 1.524 m (5')   Wt 49.9 kg (110 lb)   SpO2 94%   BMI 21.48 kg/m        PHYSICAL EXAM:     General: Patient appears well, nontoxic, comfortable  HEENT: Moist mucous membranes,  No head trauma.    Cardiovascular: Normal rate, normal rhythm, no extremity edema.  No appreciable murmur.  Respiratory: No signs  of respiratory distress, lungs are clear to auscultation bilaterally with no wheezes rhonchi or rales.  Abdominal: Soft, nontender, nondistended, no palpable masses, no guarding, no rebound  Musculoskeletal: Full range of motion of joints, no deformities appreciated.  Neurological: Alert and oriented, grossly neurologically intact.  Psychological: Normal affect and mood.  Integument: No rashes appreciated          RESULTS       Labs Ordered and Resulted from Time of ED Arrival to Time of ED Departure   COMPREHENSIVE METABOLIC PANEL - Abnormal       Result Value    Sodium 135 (*)     Potassium 4.2      Chloride 98      Carbon Dioxide (CO2) 24      Anion Gap 13      Urea Nitrogen 23.7 (*)     Creatinine 0.89      Calcium 10.1 (*)     Glucose 145 (*)     Alkaline Phosphatase 166 (*)     AST 34      ALT 27      Protein Total 7.2      Albumin 4.2      Bilirubin Total 0.3      GFR Estimate 61     MAGNESIUM - Normal    Magnesium 2.0     LACTIC ACID WHOLE BLOOD - Normal    Lactic Acid 0.8     LIPASE - Normal    Lipase 54     CBC WITH PLATELETS AND DIFFERENTIAL    WBC Count 9.5      RBC Count 4.19      Hemoglobin 12.9      Hematocrit 39.1      MCV 93      MCH 30.8      MCHC 33.0      RDW 13.8      Platelet Count 309      % Neutrophils 75      % Lymphocytes 15      % Monocytes 9      % Eosinophils 0      % Basophils 1      % Immature Granulocytes 0      NRBCs per 100 WBC 0      Absolute Neutrophils 7.1      Absolute Lymphocytes 1.4      Absolute Monocytes 0.9      Absolute Eosinophils 0.0      Absolute Basophils 0.1      Absolute Immature Granulocytes 0.0      Absolute NRBCs 0.0     ROUTINE UA WITH MICROSCOPIC REFLEX TO CULTURE   INFLUENZA A/B, RSV, & SARS-COV2 PCR   ENTERIC BACTERIA AND VIRUS PANEL BY PCR       No orders to display                     PROCEDURES:  Procedures:  Procedures           Raymond Dolan DO  Emergency Medicine  [facility] EMERGENCY DEPARTMENT     Magdaleno, Raymond Craft DO  07/14/23  0025

## 2023-07-14 NOTE — ED TRIAGE NOTES
Pt brought in by daughter from independent senior living for diarrhea x 1 day.  Pt reports weakness and general malaise.  Denies abdominal pain.  Pt was taking Oxycodone for back pain and stool softener.  Last stool softener was taken yesterday.     Triage Assessment       Row Name 07/13/23 5925       Triage Assessment (Adult)    Airway WDL WDL       Respiratory WDL    Respiratory WDL WDL

## 2023-07-14 NOTE — CONSULTS
Care Management Initial Consult    General Information  Assessment completed with: Patient, Children, Ambar at bedside, Annette by telephone  Type of CM/SW Visit: Initial Assessment    Primary Care Provider verified and updated as needed: Yes   Readmission within the last 30 days:        Reason for Consult: discharge planning  Advance Care Planning: Advance Care Planning Reviewed: present on chart     General Information Comments: TCU is recommended.    Communication Assessment  Patient's communication style: spoken language (English or Bilingual)        Cognitive  Cognitive/Neuro/Behavioral:                        Living Environment:   People in home: alone     Current living Arrangements: independent living facility      Able to return to prior arrangements: no  Living Arrangement Comments: Qualifies for [facility] Reach program    Family/Social Support:  Care provided by: self, child(heena)  Provides care for: no one  Marital Status:   Children          Description of Support System: Supportive       Current Resources:   Patient receiving home care services: No     Community Resources: None  Equipment currently used at home: cane, straight, walker, rolling (4WW, rarely uses SPC)  Supplies currently used at home:  (Walker, cane)    Employment/Financial:  Employment Status: retired        Financial Concerns: No concerns identified   Referral to Financial Worker: No     Does the patient's insurance plan have a 3 day qualifying hospital stay waiver?  Yes   Will the waiver be used for post-acute placement? Yes    Lifestyle & Psychosocial Needs:  Social Determinants of Health     Tobacco Use: Low Risk  (7/2/2023)    Patient History      Smoking Tobacco Use: Never      Smokeless Tobacco Use: Never      Passive Exposure: Not on file   Alcohol Use: Not on file   Financial Resource Strain: Not on file   Food Insecurity: Not on file   Transportation Needs: Not on file   Physical Activity: Not on file   Stress: Not on file    Social Connections: Not on file   Intimate Partner Violence: Not on file   Depression: Not at risk (2/8/2023)    PHQ-2      PHQ-2 Score: 0   Housing Stability: Not on file     Functional Status:  Prior to admission patient needed assistance:   Dependent ADLs:: Ambulation-cane, Ambulation-walker  Dependent IADLs:: Cleaning, Laundry, Shopping, Transportation     Mental Health Status:  Mental Health Status: No Current Concerns       Chemical Dependency Status:  Chemical Dependency Status: No Current Concerns           Values/Beliefs:  Spiritual, Cultural Beliefs, Catholic Practices, Values that affect care: no          Values/Beliefs Comment: Denominational    Additional Information:  Met with patient to review observation status billing under Medicare guidelines and provide a copy of the [facility] brochure. She requested patient call her daughter Annette as well. Writer called Annette by telephone to review review observation status billing under Medicare guidelines. Patient is  and lives alone in a senior independent living apartment. Both patient and Annette stated that they will be looking for patient to move to an assisted living facility. Annette states that they have used [facility] in the past and would like to use their services again. Writer made a referral to [facility] per her request.   Patient uses a walker or cane for mobility at baseline. Her daughter Annette is primary healthcare agent and son Mateusz is alternate.   Transitional care (TCU) is recommended for continued therapy and skilled nursing. Reviewed ACO Reach program with both patient and daughter Annette. Provide a list of participating facilities. Annette will speak with her mother when she gets here but may be interested in [facility].   CM will continue to monitor progression of care, review team recommendations and provide discharge planning assist as needed.      Kala Renteria RN

## 2023-07-14 NOTE — PLAN OF CARE
Assist of 1 for ADL's. Feeds self with set-up. Eating and drinking well. External catheter in use. Denies pain at rest. BP improved with IV PRN. Falls and pressure ulcer prevention plans in place. Lizette Workman RN      Problem: Self-Care Deficit  Goal: Improved Ability to Complete Activities of Daily Living  Outcome: Progressing     Problem: Hypertension Comorbidity  Goal: Blood Pressure in Desired Range  Outcome: Progressing     Problem: Osteoarthritis Comorbidity  Goal: Maintenance of Osteoarthritis Symptom Control  Outcome: Progressing     Problem: Pain Chronic (Persistent) (Comorbidity Management)  Goal: Acceptable Pain Control and Functional Ability  Outcome: Progressing  Intervention: Develop Pain Management Plan  Recent Flowsheet Documentation  Taken 7/14/2023 1502 by Lizette Workman RN  Pain Management Interventions: (has lido patch on) declines      none

## 2023-07-14 NOTE — ED NOTES
Patient resting in bed, ate breakfast without any difficulty. Has not had a stool this shift. Blood pressure earlier elevated, currently 165/ will hold apresoline. Iv fluids stopped, no complaints

## 2023-07-14 NOTE — PROGRESS NOTES
[facility]    Medicine Progress Note - Hospitalist Service    Date of Admission:  7/13/2023    Assessment & Plan     Ambar Dutton is a 89 year old female admitted on 7/13/2023. 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  -Ionized calcium within normal limits    #Elevated blood pressure  -No documented history of hypertension and does not take any PTA meds  -IV hydralazine as needed  -Amlodipine 5 mg daily started     #Prerenal azotemia  -Resolved with IV hydration  -Avoid nephrotoxins     #Drug-induced platelet defect  -On PTA aspirin  -Monitor for bleeding       Diet: Combination Diet Low Saturated Fat Na <2400mg Diet    DVT Prophylaxis: Pneumatic Compression Devices  Cedeno Catheter: Not present  Lines: None     Cardiac Monitoring: None  Code Status: Full Code      Clinically Significant Risk Factors Present on Admission                # Drug Induced Platelet Defect: home medication list includes an antiplatelet medication                 Disposition Plan      Expected Discharge Date: 07/14/2023                   Ez Vaughn MD  Hospitalist Service  [facility]  Securely message with [employer] (more info)  Text page via [employer] Paging/Directory   ______________________________________________________________________    Interval History     Patient was seen and examined.  She appears comfortable.  No headache, chest pain or shortness of breath.  She was seen by physical therapy this morning and TCU recommended.    Physical Exam   Vital Signs: Temp: 98.4  F (36.9  C) Temp src: Oral BP: (!) 153/68 Pulse: 82   Resp: 20 SpO2: 92 % O2 Device: None (Room air)    Weight: 110 lbs 0 oz    Physical Exam  HENT:      Head: Normocephalic.      Mouth/Throat:      Mouth: Mucous membranes are moist.   Cardiovascular:      Rate and Rhythm: Normal rate.      Pulses: Normal pulses.   Pulmonary:      Effort: Pulmonary effort is normal.   Abdominal:      General: Abdomen is flat.      Palpations: Abdomen is soft.   Skin:     General: Skin is warm.      Capillary Refill: Capillary refill takes less than 2 seconds.   Neurological:      Mental Status: She is alert.           Medical Decision Making       45 MINUTES SPENT BY ME on the date of service doing chart review, history, exam, documentation & further activities per the note.      Data   ------------------------- PAST 24 HR DATA REVIEWED -----------------------------------------------    I have personally reviewed the following data over the past 24 hrs:    9.5  \   12.9   / 309     139 100 17.2 /  109 (H)   4.5 29 0.72 \       ALT: 27 AST: 34 AP: 166 (H) TBILI: 0.3   ALB: 4.2 TOT PROTEIN: 7.2 LIPASE: 54       Procal: N/A CRP: N/A Lactic Acid: 0.8         Imaging results reviewed over the past 24 hrs:   No results found for this or any previous visit (from the past 24 hour(s)).

## 2023-07-14 NOTE — PHARMACY-ADMISSION MEDICATION HISTORY
Pharmacist Admission Medication History    Admission medication history is complete. The information provided in this note is only as accurate as the sources available at the time of the update.    Medication reconciliation/reorder completed by provider prior to medication history? No    Information Source(s): Family member via phone    Pertinent Information: N/A    Changes made to PTA medication list:    Added: Fibercon    Deleted: None    Changed: Miralax and senna to PRN    Medication Affordability:  Not including over the counter (OTC) medications, was there a time in the past 3 months when you did not take your medications as prescribed because of cost?: No    Allergies reviewed with patient and updates made in EHR: yes    Medication History Completed By: CHAVA SANDOVAL [facility] 7/14/2023 12:57 PM    Prior to Admission medications    Medication Sig Last Dose Taking? Auth Provider Long Term End Date   ascorbic acid, vitamin C, (VITAMIN C) 500 MG tablet Take 1,000 mg by mouth daily Past Week Yes Provider, Historical     aspirin 81 MG EC tablet Take 81 mg by mouth daily 7/12/2023 Yes Reported, Patient     calcium carbonate 500 mg, elemental, 1250 (500 Ca) MG tablet chewable Take 500 mg by mouth as needed Past Week Yes Reported, Patient     calcium carbonate-vitamin D3 600 mg(1,500mg) -200 unit per tablet Take 1 tablet by mouth daily Past Week Yes Provider, Historical     calcium polycarbophil (FIBERCON) 625 MG tablet Take 2 tablets by mouth daily Past Week Yes Unknown, Entered By History     fish oil-omega-3 fatty acids 1000 MG capsule Take 1 g by mouth daily Past Week Yes Reported, Patient     meclizine (ANTIVERT) 25 mg tablet Take 25 mg by mouth daily as needed  7/11/2023 Yes Provider, Historical     methylcellulose (CITRUCEL) 500 MG TABS tablet Take 500 mg by mouth daily Past Week Yes Reported, Patient     Multiple Vitamins-Minerals (PRESERVISION AREDS 2) CAPS Take 2 capsules by mouth daily Past Week Yes  Reported, Patient     niacin 250 MG tablet [NIACIN 250 MG TABLET] Take 250 mg by mouth daily with breakfast. Past Week Yes Provider, Historical     polyethylene glycol (MIRALAX) 17 gram packet Take 17 g by mouth daily as needed for constipation Past Week Yes Provider, Historical     senna-docusate (SENOKOT-S/PERICOLACE) 8.6-50 MG tablet Take 1 tablet by mouth daily as needed for constipation Past Week Yes Unknown, Entered By History

## 2023-07-15 ENCOUNTER — APPOINTMENT (OUTPATIENT)
Dept: PHYSICAL THERAPY | Facility: HOSPITAL | Age: 88
End: 2023-07-15
Payer: MEDICARE

## 2023-07-15 ENCOUNTER — APPOINTMENT (OUTPATIENT)
Dept: RADIOLOGY | Facility: HOSPITAL | Age: 88
End: 2023-07-15
Attending: NURSE PRACTITIONER
Payer: MEDICARE

## 2023-07-15 LAB
ANION GAP SERPL CALCULATED.3IONS-SCNC: 7 MMOL/L (ref 7–15)
BACTERIA UR CULT: NORMAL
BUN SERPL-MCNC: 15.8 MG/DL (ref 8–23)
CALCIUM SERPL-MCNC: 9.2 MG/DL (ref 8.2–9.6)
CHLORIDE SERPL-SCNC: 102 MMOL/L (ref 98–107)
CREAT SERPL-MCNC: 0.79 MG/DL (ref 0.51–0.95)
DEPRECATED HCO3 PLAS-SCNC: 29 MMOL/L (ref 22–29)
GFR SERPL CREATININE-BSD FRML MDRD: 71 ML/MIN/1.73M2
GLUCOSE SERPL-MCNC: 83 MG/DL (ref 70–99)
POTASSIUM SERPL-SCNC: 3.8 MMOL/L (ref 3.4–5.3)
SODIUM SERPL-SCNC: 138 MMOL/L (ref 136–145)

## 2023-07-15 PROCEDURE — 250N000013 HC RX MED GY IP 250 OP 250 PS 637: Performed by: HOSPITALIST

## 2023-07-15 PROCEDURE — 80048 BASIC METABOLIC PNL TOTAL CA: CPT | Performed by: HOSPITALIST

## 2023-07-15 PROCEDURE — 250N000011 HC RX IP 250 OP 636: Mod: JZ | Performed by: HOSPITALIST

## 2023-07-15 PROCEDURE — 36415 COLL VENOUS BLD VENIPUNCTURE: CPT | Performed by: HOSPITALIST

## 2023-07-15 PROCEDURE — G0378 HOSPITAL OBSERVATION PER HR: HCPCS

## 2023-07-15 PROCEDURE — 72070 X-RAY EXAM THORAC SPINE 2VWS: CPT

## 2023-07-15 PROCEDURE — 99232 SBSQ HOSP IP/OBS MODERATE 35: CPT | Performed by: HOSPITALIST

## 2023-07-15 PROCEDURE — 97530 THERAPEUTIC ACTIVITIES: CPT | Mod: GP

## 2023-07-15 RX ADMIN — LIDOCAINE 1 PATCH: 4 PATCH TOPICAL at 08:55

## 2023-07-15 RX ADMIN — AMLODIPINE BESYLATE 5 MG: 5 TABLET ORAL at 08:51

## 2023-07-15 RX ADMIN — ACETAMINOPHEN 975 MG: 325 TABLET ORAL at 22:21

## 2023-07-15 RX ADMIN — METHOCARBAMOL 500 MG: 500 TABLET, FILM COATED ORAL at 09:02

## 2023-07-15 RX ADMIN — METHYLCELLULOSE 500 MG: 500 TABLET ORAL at 08:51

## 2023-07-15 RX ADMIN — CEFTRIAXONE SODIUM 1 G: 1 INJECTION, POWDER, FOR SOLUTION INTRAMUSCULAR; INTRAVENOUS at 17:20

## 2023-07-15 RX ADMIN — ACETAMINOPHEN 975 MG: 325 TABLET ORAL at 14:51

## 2023-07-15 RX ADMIN — ACETAMINOPHEN 975 MG: 325 TABLET ORAL at 06:12

## 2023-07-15 ASSESSMENT — ACTIVITIES OF DAILY LIVING (ADL)
ADLS_ACUITY_SCORE: 35
ADLS_ACUITY_SCORE: 33
ADLS_ACUITY_SCORE: 33
ADLS_ACUITY_SCORE: 35

## 2023-07-15 NOTE — PLAN OF CARE
PRIMARY DIAGNOSIS: GENERALIZED WEAKNESS    OUTPATIENT/OBSERVATION GOALS TO BE MET BEFORE DISCHARGE  1. Orthostatic performed: N/A    2. Tolerating PO medications: Yes    3. Return to near baseline physical activity: No    4. Cleared for discharge by consultants (if involved): No    Discharge Planner Nurse   Safe discharge environment identified: No  Barriers to discharge: Yes       Entered by: Kelsey Carmona RN 07/14/2023 7:31 PM     Please review provider order for any additional goals.   Nurse to notify provider when observation goals have been met and patient is ready for discharge.Goal Outcome Evaluation:       Pt started on antibiotic for UA. Pt went to [facility]. Left message on daughter cell phone about move 207

## 2023-07-15 NOTE — DISCHARGE INSTRUCTIONS
WEARING THE LUMBAR BRACE:    * Wear the brace when out of bed or sitting upright in bed.  * You should apply the brace before standing.  * You may shower seated without brace.   Sit down on shower chair, remove brace   Shower   Dry   Re-apply brace before standing.   Take care not to fall  *If your brace is not fitting call [facility] [phone]  *Check  your skin under your brace at least daily.    Avoid bending, twisting, lifting greater than 5-10 pounds while healing your fractures    See your primary care provider as an outpatient to discuss obtaining dexa scan to evaluate osteoporosis and treatment options.     Neurosurgery will call you to set up your follow up appointment. Please call  if you have questions or do not hear from us.

## 2023-07-15 NOTE — PROGRESS NOTES
PRIMARY DIAGNOSIS: GENERALIZED WEAKNESS    OUTPATIENT/OBSERVATION GOALS TO BE MET BEFORE DISCHARGE  1. Orthostatic performed: N/A    2. Tolerating PO medications: Yes    3. Return to near baseline physical activity: Yes    4. Cleared for discharge by consultants (if involved): No    Discharge Planner Nurse   Safe discharge environment identified: No  Barriers to discharge: Yes       Entered by: Liliana Rogel RN 07/15/2023 2:17 PM     Pt has orthotic/back brace in place. Has been up in the chair for a few hours. Currently back in bed. Pt able to ambulate to the bathroom independently with brace on. Reminded pt to use call light while in bed.     Plan is for TCU placement.     Please review provider order for any additional goals.   Nurse to notify provider when observation goals have been met and patient is ready for discharge.

## 2023-07-15 NOTE — PLAN OF CARE
PRIMARY DIAGNOSIS: GENERALIZED WEAKNESS    OUTPATIENT/OBSERVATION GOALS TO BE MET BEFORE DISCHARGE  1. Orthostatic performed: N/A    2. Tolerating PO medications: Yes    3. Return to near baseline physical activity: No    4. Cleared for discharge by consultants (if involved): No    Discharge Planner Nurse   Safe discharge environment identified: No  Barriers to discharge: Yes       Entered by: Israel Gastelum RN 07/15/2023 3:21 AM     Patient is alert and oriented. Denies pain. Assist x1 to bedside commode. Slept well, most of the night. Call light within reach.     Please review provider order for any additional goals.   Nurse to notify provider when observation goals have been met and patient is ready for discharge.

## 2023-07-15 NOTE — PROGRESS NOTES
[facility]    Medicine Progress Note - Hospitalist Service    Date of Admission:  7/13/2023    Assessment & Plan     Ambar Dutton is a 89 year old female admitted on 7/13/2023. 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  See full report below    #Diarrhea  -Improving  -Likely secondary to stool softeners  -Supportive management     #Hyponatremia, mild  -Secondary to decreased oral intake     #Urinary tract infection ruled out  -Urine culture  less than 10,000 CFU of mixed ghassan     #Mild hypercalcemia  -Resolved  -Secondary to mild dehydration and oral supplements  -Continue IV fluids   -Ionized calcium within normal limits    #Elevated blood pressure  -No documented history of hypertension and does not take any PTA meds  -IV hydralazine as needed  -Amlodipine 5 mg daily started     #Prerenal azotemia  -Resolved with IV hydration  -Avoid nephrotoxins     #Drug-induced platelet defect  -On PTA aspirin  -Monitor for bleeding       Diet: Combination Diet Low Saturated Fat Na <2400mg Diet    DVT Prophylaxis: Pneumatic Compression Devices  Cedeno Catheter: Not present  Lines:  None     Cardiac Monitoring: None  Code Status: Full Code      Clinically Significant Risk Factors Present on Admission                # Drug Induced Platelet Defect: home medication list includes an antiplatelet medication                 Disposition Plan           Ez Vaughn MD  Hospitalist Service  [facility]  Securely message with [employer] (more info)  Text page via [employer] Paging/Directory   ______________________________________________________________________    Interval History     Patient was seen and examined with RN at bedside.  She seems comfortable.  Diarrhea less frequent and improving.  Remains of persistent back pain.  MRI findings noted.  She will continue discussions with neurosurgery.  TLSO brace recommended    Physical Exam   Vital Signs: Temp: 98.3  F (36.8  C) Temp src: Oral BP: 117/58 Pulse: 70   Resp: 16 SpO2: 93 % O2 Device: None (Room air)    Weight: 111 lbs 5.32 oz    Physical Exam  HENT:      Head: Normocephalic.      Mouth/Throat:      Mouth: Mucous membranes are moist.   Cardiovascular:      Rate and Rhythm: Normal rate.      Pulses: Normal pulses.   Pulmonary:      Effort: Pulmonary effort is normal.   Abdominal:      General: Abdomen is flat.      Palpations: Abdomen is soft.   Musculoskeletal:      Lumbar back: Tenderness present. Decreased range of motion.   Skin:     General: Skin is warm.      Capillary Refill: Capillary refill takes less than 2 seconds.   Neurological:      Mental Status: She is alert.           Medical Decision Making       40 MINUTES SPENT BY ME on the date of service doing chart review, history, exam, documentation & further activities per the note.      Data   ------------------------- PAST 24 HR DATA REVIEWED -----------------------------------------------    I have personally reviewed the following data over the past 24 hrs:    N/A  \   N/A   / N/A     139 100 17.2 /  109 (H)   4.5 29 0.72 \       Imaging results reviewed over the  past 24 hrs:   Recent Results (from the past 24 hour(s))   MR Thoracic Spine w/o Contrast    Narrative    EXAM: MR THORACIC SPINE W/O CONTRAST  LOCATION: [facility]  DATE: 7/14/2023    INDICATION: Without contrast only. T8 fracture. Evaluate whether any acute portion or completely chronic.  COMPARISON: 7/2/2023  TECHNIQUE: Routine Thoracic Spine MRI without IV contrast.    FINDINGS:   There is a moderate compression deformity of T11 with diffuse marrow edema and up to 50% loss of height centrally. This is new when compared to 7/2/2023 and is consistent with an acute/early subacute compression fracture. Moderate anterior wedging   fracture at T8 stable and favored to be late subacute or chronic. Deformity of C7. Severe chronic burst-type fracture of T12. Mild to moderate multilevel disc desiccation. Disc heights are relatively preserved. Annular bulges at T7-T8, T8-T9, at T12-L1   and L1-L2. No high-grade central canal stenosis. Mild to moderate multilevel neural foraminal narrowing. Chronic compression deformities at L2.    COPD.      Impression    IMPRESSION:  1.  Moderate acute/early subacute compression fracture of T11 with up to 50% loss of height centrally.  2.  Moderate anterior wedging fracture of T8 favored to be late subacute or chronic.  3.  Severe chronic burst-type fracture of T12.  4.  No high-grade central canal stenosis or neural foraminal narrowing at any level.

## 2023-07-15 NOTE — PROGRESS NOTES
Neurosurgery Follow UP  July 15, 2023    A/P: Ambar Nicole a 89 year old with back pain for about two weeks. Known fractures on Ct 7/2/2023 without evaluation by spine provider or brace. MRI 7/13/2023 T11 fracture likely subacute with 50% height loss and edema. Not noted on CT scan 7/2/2023. T8 fracture stable compared to CT scan and felt to be subacute or chronic. Chronic burst fracture T12. Chronic T7 deformity.      Discussed brace recommendations with Ambar. She is in agreement. Off the shelf TLSO ordered. Will obtain upright XR after brace in place. She reports more pain today. The brace may help, would also continue tylenol, offer muscle relaxer and as needed dilaudid. She would like to avoid any medications that cause fogginess.     PLAN:   1. Brace - wear when upright and out of bed - duration likely 8-12 weeks  2. Avoid bending, twisting, lifting greater than 5-10 pounds  3. Osteoporosis eval OP   4. XR after brace in place  5. OK for activity  6. Could consider vertebroplasty if pain does not improve. Ambar states she would not want any surgery but not sure if she would want to consider this option.     Physical Exam  /58 (BP Location: Right arm)   Pulse 70   Temp 98.3  F (36.8  C) (Oral)   Resp 16   Ht 1.524 m (5')   Wt 50.5 kg (111 lb 5.3 oz)   SpO2 93%   BMI 21.74 kg/m      General: alert, RICE. Speech clear.     Motor: normal bulk and tone     Strength:   Full LE strength     Sensation: intact to  Light touch     Imaging: Reviewed MRI and CT     Irena Brown, DEANGELO-CNP  [facility] Neurosurgery  O: [phone]

## 2023-07-15 NOTE — PROGRESS NOTES
S: Pt was seen today at [facility] for eval/fitting for a TLSO Aspen Horizon as ordered by     O/G: The objective/goal is to reduce pain and motion of the spine.    A: At this time I have fit pt with a Rand Horizon TLSO  with Velcro closures.  The pt was instructed on donning/doffing; care and cleaning of the TLSO was explained.  Care was taken in selection of the proper size TLSO to insure an intimate fit, provide clearance at the rectus femoris, when sitting and to avoid impingement at the breast anteriorly and scapula posteriorly.  Upon completion of the initial fitting the pt had no complaints, and the fit of the orthosis appeared to be satisfactory at this time.    P: the pt was instructed to call if any problems or questions arise.   PEPITO Solis.

## 2023-07-15 NOTE — UTILIZATION REVIEW
Concurrent stay review; Secondary Review Determination     Under the authority of the Utilization Management Committee, the utilization review process indicated a secondary review on Ambar Dutton.  The review outcome is based on review of the medical records, discussions with staff, and applying clinical experience noted on the date of the review.        (x) Observation Status Appropriate - Concurrent stay review    RATIONALE FOR DETERMINATION   Ambar Dutton is a 89 yr old female who presented 7/13/23 after back pain and generalized weakness.   Noted subacute compression fracture and chronic fracture spine.  Pain managed with APAP.  Needs TCU for safe disposition.  Some dehydration treated with IVF now stopped.  Neurosurgery recommended off shelf TLSO and outpatient follow-up.  Needs safe disposition.    Patient is clinically improving and there is no clear indication to change patient's status to inpatient. The severity of illness, intensity of service provided, expected LOS and risk for adverse outcome make the care appropriate for observation.    The information on this document is developed by the utilization review team in order for the business office to ensure compliance.  This only denotes the appropriateness of proper admission status and does not reflect the quality of care rendered.         The definitions of Inpatient Status and Observation Status used in making the determination above are those provided in the CMS Coverage Manual, Chapter 1 and Chapter 6, section 70.4.      Sincerely,   Mary Estrada MD  Utilization Review  Physician Advisor  [facility]

## 2023-07-15 NOTE — PLAN OF CARE
PRIMARY DIAGNOSIS: GENERALIZED WEAKNESS    OUTPATIENT/OBSERVATION GOALS TO BE MET BEFORE DISCHARGE  1. Orthostatic performed: N/A    2. Tolerating PO medications: Yes    3. Return to near baseline physical activity: Yes    4. Cleared for discharge by consultants (if involved): No    Discharge Planner Nurse   Safe discharge environment identified: No  Barriers to discharge: Yes, waiting for placement        Entered by: Dwain Mills RN 07/15/2023 4:54 PM     Please review provider order for any additional goals.   Nurse to notify provider when observation goals have been met and patient is ready for discharge.Goal Outcome Evaluation:

## 2023-07-15 NOTE — PLAN OF CARE
"PRIMARY DIAGNOSIS: \"GENERIC\" NURSING  OUTPATIENT/OBSERVATION GOALS TO BE MET BEFORE DISCHARGE:  1. ADLs back to baseline: No    2. Activity and level of assistance: Up with standby assistance.    3. Pain status: Improved-controlled with oral pain medications.    4. Return to near baseline physical activity: No     Discharge Planner Nurse   Safe discharge environment identified: No  Barriers to discharge: Yes       Entered by: Israel Gastelum RN 07/15/2023 12:53 AM     Please review provider order for any additional goals.   Nurse to notify provider when observation goals have been met and patient is ready for discharge.         "

## 2023-07-15 NOTE — PROGRESS NOTES
"Care Management Follow Up    Length of Stay (days): 0    Expected Discharge Date: 07/17/2023     Concerns to be Addressed:     Care Progression  Patient plan of care discussed at interdisciplinary rounds: Yes    Anticipated Discharge Disposition:  TCU      Anticipated Discharge Services:  None  Anticipated Discharge DME:  None    Patient/family educated on Medicare website which has current facility and service quality ratings:  yes  Education Provided on the Discharge Plan:  Yes  Patient/Family in Agreement with the Plan:  yes    Referrals Placed by CM/SW:  Post Acute  Private pay costs discussed: Not applicable    Additional Information:  RNCM sent referrals to TCU for local ACO program.     Social HX: \"Patient is  and lives alone in a senior independent living apartment. Both patient and Annette stated that they will be looking for patient to move to an assisted living facility.\"    CM will continue to follow care progression and aide in discharge planning as needed.     Marie Edwards RN        "

## 2023-07-15 NOTE — PROGRESS NOTES
PRIMARY DIAGNOSIS: GENERALIZED WEAKNESS    OUTPATIENT/OBSERVATION GOALS TO BE MET BEFORE DISCHARGE  1. Orthostatic performed: N/A    2. Tolerating PO medications: Yes    3. Return to near baseline physical activity: Yes    4. Cleared for discharge by consultants (if involved): No    Discharge Planner Nurse   Safe discharge environment identified: No  Barriers to discharge: Yes       Entered by: Liliana Rogel RN 07/15/2023 2:16 PM     Please review provider order for any additional goals.   Nurse to notify provider when observation goals have been met and patient is ready for discharge.

## 2023-07-15 NOTE — PLAN OF CARE
PRIMARY DIAGNOSIS: GENERALIZED WEAKNESS    OUTPATIENT/OBSERVATION GOALS TO BE MET BEFORE DISCHARGE  1. Orthostatic performed: N/A    2. Tolerating PO medications: Yes    3. Return to near baseline physical activity: No    4. Cleared for discharge by consultants (if involved): No    Discharge Planner Nurse   Safe discharge environment identified: No  Barriers to discharge: Yes       Entered by: Israel Gastelum RN 07/15/2023 12:56 AM     Please review provider order for any additional goals.   Nurse to notify provider when observation goals have been met and patient is ready for discharge.

## 2023-07-16 ENCOUNTER — APPOINTMENT (OUTPATIENT)
Dept: OCCUPATIONAL THERAPY | Facility: HOSPITAL | Age: 88
End: 2023-07-16
Attending: HOSPITALIST
Payer: MEDICARE

## 2023-07-16 ENCOUNTER — TELEPHONE (OUTPATIENT)
Dept: NEUROLOGY | Facility: CLINIC | Age: 88
End: 2023-07-16
Payer: MEDICARE

## 2023-07-16 DIAGNOSIS — M81.0 AGE-RELATED OSTEOPOROSIS WITHOUT CURRENT PATHOLOGICAL FRACTURE: ICD-10-CM

## 2023-07-16 DIAGNOSIS — S22.080A CLOSED WEDGE COMPRESSION FRACTURE OF T11 VERTEBRA, INITIAL ENCOUNTER (H): Primary | ICD-10-CM

## 2023-07-16 PROCEDURE — 250N000013 HC RX MED GY IP 250 OP 250 PS 637: Performed by: HOSPITALIST

## 2023-07-16 PROCEDURE — 99232 SBSQ HOSP IP/OBS MODERATE 35: CPT | Performed by: HOSPITALIST

## 2023-07-16 PROCEDURE — G0378 HOSPITAL OBSERVATION PER HR: HCPCS

## 2023-07-16 PROCEDURE — 97535 SELF CARE MNGMENT TRAINING: CPT | Mod: GO

## 2023-07-16 PROCEDURE — 97166 OT EVAL MOD COMPLEX 45 MIN: CPT | Mod: GO

## 2023-07-16 RX ADMIN — AMLODIPINE BESYLATE 5 MG: 5 TABLET ORAL at 09:48

## 2023-07-16 RX ADMIN — ACETAMINOPHEN 975 MG: 325 TABLET ORAL at 06:34

## 2023-07-16 RX ADMIN — MECLIZINE HYDROCHLORIDE 25 MG: 25 TABLET ORAL at 12:17

## 2023-07-16 RX ADMIN — ACETAMINOPHEN 975 MG: 325 TABLET ORAL at 22:01

## 2023-07-16 RX ADMIN — ACETAMINOPHEN 975 MG: 325 TABLET ORAL at 14:57

## 2023-07-16 RX ADMIN — Medication 1 MG: at 22:01

## 2023-07-16 RX ADMIN — METHYLCELLULOSE 500 MG: 500 TABLET ORAL at 09:48

## 2023-07-16 RX ADMIN — LIDOCAINE 1 PATCH: 4 PATCH TOPICAL at 09:41

## 2023-07-16 ASSESSMENT — ACTIVITIES OF DAILY LIVING (ADL)
ADLS_ACUITY_SCORE: 33
ADLS_ACUITY_SCORE: 34
ADLS_ACUITY_SCORE: 34
ADLS_ACUITY_SCORE: 38
ADLS_ACUITY_SCORE: 35
ADLS_ACUITY_SCORE: 37
ADLS_ACUITY_SCORE: 33
ADLS_ACUITY_SCORE: 33
ADLS_ACUITY_SCORE: 35
ADLS_ACUITY_SCORE: 34

## 2023-07-16 NOTE — PLAN OF CARE
Goal Outcome Evaluation:  Pt reports that back pain is improved with brace. She sat up in the chair for breakfast and got cleaned up in the bathroom. Pt then was working with OT when she began to have a dizzy spell. VS WNL. Pt reports that this is similar to her vertigo symptoms that she has chronically. Meclizine PRN given and will monitor for effect.

## 2023-07-16 NOTE — PLAN OF CARE
PRIMARY DIAGNOSIS: GENERALIZED WEAKNESS    OUTPATIENT/OBSERVATION GOALS TO BE MET BEFORE DISCHARGE  1. Orthostatic performed: N/A    2. Tolerating PO medications: Yes    3. Return to near baseline physical activity: Yes    4. Cleared for discharge by consultants (if involved): No    Discharge Planner Nurse   Safe discharge environment identified: No  Barriers to discharge: Yes, waiting for safe discharge environment.         Entered by: Dwain Mills RN 07/15/2023 10:40 PM     Please review provider order for any additional goals.   Nurse to notify provider when observation goals have been met and patient is ready for discharge.Goal Outcome Evaluation:

## 2023-07-16 NOTE — PROGRESS NOTES
PRIMARY DIAGNOSIS: GENERALIZED WEAKNESS    OUTPATIENT/OBSERVATION GOALS TO BE MET BEFORE DISCHARGE  1. Orthostatic performed: N/A    2. Tolerating PO medications: Yes    3. Return to near baseline physical activity: Yes    4. Cleared for discharge by consultants (if involved): No    Discharge Planner Nurse   Safe discharge environment identified: No  Barriers to discharge: Yes. Waiting for placement       Entered by: Jaci Cardona RN 07/16/2023 3:27 AM     Please review provider order for any additional goals.   Nurse to notify provider when observation goals have been met and patient is ready for discharge.

## 2023-07-16 NOTE — TELEPHONE ENCOUNTER
ZULEMA appointment with XR in 4 weeks to follow up on T11 fracture    CELENA Matthew  [facility]  O: [phone]

## 2023-07-16 NOTE — PLAN OF CARE
Problem: Plan of Care - These are the overarching goals to be used throughout the patient stay.    Goal: Absence of Hospital-Acquired Illness or Injury  Outcome: Progressing  Intervention: Identify and Manage Fall Risk  Recent Flowsheet Documentation  Taken 7/16/2023 0110 by Jaci Cardona RN  Safety Promotion/Fall Prevention:   clutter free environment maintained   lighting adjusted   patient and family education   nonskid shoes/slippers when out of bed   safety round/check completed   increase visualization of patient   increased rounding and observation  Intervention: Prevent Skin Injury  Recent Flowsheet Documentation  Taken 7/16/2023 0110 by Jaci Cardona RN  Body Position: position changed independently  Goal: Optimal Comfort and Wellbeing  Outcome: Progressing  Goal: Readiness for Transition of Care  Outcome: Progressing     Problem: Hypertension Comorbidity  Goal: Blood Pressure in Desired Range  Outcome: Progressing  Intervention: Maintain Blood Pressure Management  Recent Flowsheet Documentation  Taken 7/16/2023 0110 by Jaci Cardona RN  Medication Review/Management: medications reviewed     Problem: Osteoarthritis Comorbidity  Goal: Maintenance of Osteoarthritis Symptom Control  Outcome: Progressing  Intervention: Maintain Osteoarthritis Symptom Control  Recent Flowsheet Documentation  Taken 7/16/2023 0110 by Jaci Cardona RN  Assistive Device Utilized:   gait belt   walker  Activity Management: activity adjusted per tolerance  Medication Review/Management: medications reviewed     Problem: Pain Chronic (Persistent) (Comorbidity Management)  Goal: Acceptable Pain Control and Functional Ability  Outcome: Progressing  Intervention: Manage Persistent Pain  Recent Flowsheet Documentation  Taken 7/16/2023 0110 by Jaci Cardona RN  Medication Review/Management: medications reviewed   Goal Outcome Evaluation:         Shift 23:00-07:30:     VSS. Patient alert and oriented. No complaints of pain.  Scheduled tylenol administered. Patient up to bathroom with 1 assist.     Jaci Cardona RN

## 2023-07-16 NOTE — PLAN OF CARE
Problem: Plan of Care - These are the overarching goals to be used throughout the patient stay.    Goal: Optimal Comfort and Wellbeing  Outcome: Progressing  Intervention: Monitor Pain and Promote Comfort  Recent Flowsheet Documentation  Taken 7/15/2023 1900 by Dwain Mills RN  Pain Management Interventions: medication (see MAR)     Problem: Pain Chronic (Persistent) (Comorbidity Management)  Goal: Acceptable Pain Control and Functional Ability  Outcome: Progressing  Intervention: Develop Pain Management Plan  Recent Flowsheet Documentation  Taken 7/15/2023 1900 by Dwain Mills RN  Pain Management Interventions: medication (see MAR)  Intervention: Manage Persistent Pain  Recent Flowsheet Documentation  Taken 7/15/2023 1900 by Dwain Mills RN  Medication Review/Management: medications reviewed   Goal Outcome Evaluation:    Pt stated that she have mild lower back pain but does not want any pain medication.  Pt ambulate to the bathroom with standby assist and back brace on.  Her daughter and son visited.  Pt is talkative and pleasant.

## 2023-07-16 NOTE — PROGRESS NOTES
[facility]    Medicine Progress Note - Hospitalist Service    Date of Admission:  7/13/2023    Assessment & Plan     Ambar Dutton is a 89 year old female admitted on 7/13/2023. 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  See full report below    #Diarrhea  -Improving  -Likely secondary to stool softeners  -Supportive management     #Hyponatremia, mild  -Secondary to decreased oral intake     #Urinary tract infection ruled out  -Urine culture  less than 10,000 CFU of mixed ghassan  -Antibiotics discontinued    #Mild hypercalcemia  -Resolved  -Secondary to mild dehydration and oral supplements  -Continue IV fluids   -Ionized calcium within normal limits    #Elevated blood pressure  -No documented history of hypertension and does not take any PTA meds  -IV hydralazine as needed  -Amlodipine 5 mg daily started     #Prerenal azotemia  -Resolved with IV hydration  -Avoid nephrotoxins     #Drug-induced platelet defect  -On PTA aspirin  -Monitor for bleeding       Diet: Combination Diet Low Saturated Fat Na <2400mg Diet    DVT Prophylaxis: Pneumatic Compression Devices  Cedeno  Catheter: Not present  Lines: None     Cardiac Monitoring: None  Code Status: Full Code      Clinically Significant Risk Factors Present on Admission                # Drug Induced Platelet Defect: home medication list includes an antiplatelet medication                 Disposition Plan      Expected Discharge Date: 07/17/2023    Discharge Delays: Placement - TCU            Ez Vaughn MD  Hospitalist Service  [facility]  Securely message with [employer] (more info)  Text page via [employer] Paging/Directory   ______________________________________________________________________    Interval History     Patient was seen and examined, she has a brace in place.  Afebrile.  No headache or chest pain.  She is improving slowly and anticipating discharge to TCU in the next 1 to 2 days.    Daughter updated via telephone call      Physical Exam   Vital Signs: Temp: 97.7  F (36.5  C) Temp src: Oral BP: (!) 142/74 Pulse: 74   Resp: 16 SpO2: 92 % O2 Device: None (Room air)    Weight: 111 lbs 5.32 oz    Physical Exam  HENT:      Head: Normocephalic.      Mouth/Throat:      Mouth: Mucous membranes are moist.   Cardiovascular:      Rate and Rhythm: Normal rate.      Pulses: Normal pulses.   Pulmonary:      Effort: Pulmonary effort is normal.   Abdominal:      General: Abdomen is flat.      Palpations: Abdomen is soft.   Musculoskeletal:      Lumbar back: Tenderness present. Decreased range of motion.   Skin:     General: Skin is warm.      Capillary Refill: Capillary refill takes less than 2 seconds.   Neurological:      Mental Status: She is alert.           Medical Decision Making       40 MINUTES SPENT BY ME on the date of service doing chart review, history, exam, documentation & further activities per the note.      Data   ------------------------- PAST 24 HR DATA REVIEWED -----------------------------------------------    I have personally reviewed the following data over the past 24 hrs:    N/A  \   N/A    / N/A     138 102 15.8 /  83   3.8 29 0.79 \       Imaging results reviewed over the past 24 hrs:   Recent Results (from the past 24 hour(s))   XR Thoracic Spine 2 Views    Narrative    EXAM: XR THORACIC SPINE 2 VIEWS  LOCATION: [facility]  DATE: 7/15/2023    INDICATION: AP LAT upright in brace; Eval T8, T11 fracture stability  COMPARISON: CT 07/02/2023 MRI 07/14/2023      Impression    IMPRESSION: Exaggerated thoracic kyphosis. Redemonstrated is an acute anterior compression fractures of the T11 vertebra. There is approximately 50% vertebral body height loss which is unchanged from the comparison. The probable subacute anterior   compression fracture of the T8 vertebra with approximately 70% vertebral body height loss is unchanged from comparison MRI. Chronic T7 and T12 fractures are unchanged. The bones are demineralized. Moderate spondylosis. Pulmonary hyperinflation compatible   with COPD.

## 2023-07-16 NOTE — PROGRESS NOTES
PRIMARY DIAGNOSIS: GENERALIZED WEAKNESS    OUTPATIENT/OBSERVATION GOALS TO BE MET BEFORE DISCHARGE  1. Orthostatic performed: N/A    2. Tolerating PO medications: Yes    3. Return to near baseline physical activity: Yes    4. Cleared for discharge by consultants (if involved): No    Discharge Planner Nurse   Safe discharge environment identified: No  Barriers to discharge: Yes       Entered by: Jaci Cardona RN 07/16/2023 5:19 AM     Please review provider order for any additional goals.   Nurse to notify provider when observation goals have been met and patient is ready for discharge.    Jaci Cardona RN

## 2023-07-16 NOTE — PROGRESS NOTES
PRIMARY DIAGNOSIS: ACUTE PAIN  OUTPATIENT/OBSERVATION GOALS TO BE MET BEFORE DISCHARGE:  1. Pain Status: Improved    2. Return to near baseline physical activity: No    3. Cleared for discharge by consultants (if involved): Yes    Discharge Planner Nurse   Safe discharge environment identified: No  Barriers to discharge: Yes Awaiting TCU placement       Entered by: Ginna Fox RN 07/16/2023 5:12 PM     Please review provider order for any additional goals.   Nurse to notify provider when observation goals have been met and patient is ready for discharge.   Pt reports that dizziness is improved with meclizine. Pt is learning her restrictions with her back fracture.

## 2023-07-16 NOTE — PROGRESS NOTES
Chart review. XR shows stable fracture. Patient reports to nursing back pain improved with brace. We will sign off. Follow up in clinic in 4 weeks. Please call with questions.     CELENA Matthew  [facility]surgery  O: [phone]

## 2023-07-16 NOTE — PROGRESS NOTES
07/16/23 0945   Appointment Info   Signing Clinician's Name / Credentials (OT) Marie LEVIN benjamin Maciasreynaldo CONNERTR/L   Living Environment   People in Home alone   Current Living Arrangements independent living facility   Home Accessibility no concerns   Transportation Anticipated family or friend will provide   Self-Care   Current Activity Tolerance moderate   Equipment Currently Used at Home walker, rolling   Fall history within last six months no   Activity/Exercise/Self-Care Comment Pt is independent with basic ADLs, cooking, cleaning, and laundry   General Information   Onset of Illness/Injury or Date of Surgery 07/13/23   Referring Physician Adela   Existing Precautions/Restrictions brace worn when out of bed;spinal   Cognitive Status Examination   Cognitive Status Comments Appears forgetful   Visual Perception   Visual Impairment/Limitations WFL   Sensory   Sensory Quick Adds sensation intact   Range of Motion Comprehensive   General Range of Motion no range of motion deficits identified   Strength Comprehensive (MMT)   Comment, General Manual Muscle Testing (MMT) Assessment appears WFL for ADLs, not formally tested   Bed Mobility   Comment (Bed Mobility) SBA   Transfers   Transfer Comments CGA   Balance   Balance Comments CGA   Activities of Daily Living   BADL Assessment/Intervention   (LB dressing, SBA)   Clinical Impression   Criteria for Skilled Therapeutic Interventions Met (OT) Yes, treatment indicated   OT Problem List-Impairments impacting ADL activity tolerance impaired;balance;mobility;pain   Assessment of Occupational Performance 3-5 Performance Deficits   Planned Therapy Interventions (OT) ADL retraining;transfer training   Clinical Decision Making Complexity (OT) moderate complexity   Risk & Benefits of therapy have been explained evaluation/treatment results reviewed;patient   Clinical Impression Comments Pt seen bedside for OT eval and treatment.  Pt demonstrates decreased independence with ADLs  and mobility.  OT to continue to address.  Recommend TCU at discharge as pt lives alone and is [place] baseline.   OT Total Evaluation Time   OT Eval, Moderate Complexity Minutes (57821) 10   OT Goals   Therapy Frequency (OT) Daily   OT Predicted Duration/Target Date for Goal Attainment 07/23/23   OT Goals Lower Body Dressing;Transfers   OT: Lower Body Dressing Supervision/stand-by assist   OT: Transfer Modified independent   OT Discharge Planning   OT Plan transfers in room and bath, dressing iwth pants   OT Discharge Recommendation (DC Rec) Transitional Care Facility   OT Rationale for DC Rec TCU recommended as pt lives alone and is below baseline for ADLs   OT Brief overview of current status CGA                                                                                   [facility]      OUTPATIENT OCCUPATIONAL THERAPY  EVALUATION  PLAN OF TREATMENT FOR OUTPATIENT REHABILITATION  (COMPLETE FOR INITIAL CLAIMS ONLY)  Patient's Last Name, First Name, M.I.  YOB: 1932  Ambar Dutton                          Provider's Name  [facility] Medical Record No.  [id number]                               Onset Date:  07/13/23   Start of Care Date:        Type:     ___PT   _X_OT   ___SLP Medical Diagnosis:                           OT Diagnosis:      Visits from SOC:  1   _________________________________________________________________________________  Plan of Treatment/Functional Goals    Planned Interventions: (P) ADL retraining, transfer training   Goals: See Occupational Therapy Goals on Care Plan in [facility] electronic health record.    Therapy Frequency: (P) Daily  Predicted Duration of Therapy Intervention: (P) 07/23/23  _________________________________________________________________________________    I CERTIFY THE NEED FOR THESE SERVICES FURNISHED UNDER        THIS PLAN OF TREATMENT AND WHILE UNDER MY CARE .             Physician  Signature               Date    X_____________________________________________________                   ,      Referring Physician: Chapman (P)            Initial Assessment        See Occupational Therapy evaluation dated   in Epic electronic health record.

## 2023-07-17 ENCOUNTER — APPOINTMENT (OUTPATIENT)
Dept: OCCUPATIONAL THERAPY | Facility: HOSPITAL | Age: 88
End: 2023-07-17
Payer: MEDICARE

## 2023-07-17 ENCOUNTER — LAB REQUISITION (OUTPATIENT)
Dept: LAB | Facility: CLINIC | Age: 88
End: 2023-07-17
Payer: MEDICARE

## 2023-07-17 VITALS
HEIGHT: 60 IN | OXYGEN SATURATION: 95 % | HEART RATE: 98 BPM | BODY MASS INDEX: 21.86 KG/M2 | TEMPERATURE: 98 F | RESPIRATION RATE: 18 BRPM | DIASTOLIC BLOOD PRESSURE: 63 MMHG | WEIGHT: 111.33 LBS | SYSTOLIC BLOOD PRESSURE: 133 MMHG

## 2023-07-17 DIAGNOSIS — R53.1 WEAKNESS: ICD-10-CM

## 2023-07-17 PROBLEM — R19.7 DIARRHEA: Status: ACTIVE | Noted: 2022-07-20

## 2023-07-17 PROBLEM — K59.00 CONSTIPATION: Status: ACTIVE | Noted: 2023-07-17

## 2023-07-17 PROCEDURE — G0378 HOSPITAL OBSERVATION PER HR: HCPCS

## 2023-07-17 PROCEDURE — 250N000011 HC RX IP 250 OP 636: Mod: JZ | Performed by: HOSPITALIST

## 2023-07-17 PROCEDURE — 250N000013 HC RX MED GY IP 250 OP 250 PS 637: Performed by: HOSPITALIST

## 2023-07-17 PROCEDURE — 96376 TX/PRO/DX INJ SAME DRUG ADON: CPT

## 2023-07-17 PROCEDURE — 99238 HOSP IP/OBS DSCHRG MGMT 30/<: CPT | Performed by: INTERNAL MEDICINE

## 2023-07-17 PROCEDURE — 97535 SELF CARE MNGMENT TRAINING: CPT | Mod: GO

## 2023-07-17 RX ORDER — OXYCODONE HYDROCHLORIDE 5 MG/1
2.5 TABLET ORAL EVERY 6 HOURS PRN
Qty: 10 TABLET | Refills: 0 | Status: SHIPPED | OUTPATIENT
Start: 2023-07-17 | End: 2023-07-18

## 2023-07-17 RX ORDER — LIDOCAINE 4 G/G
1 PATCH TOPICAL EVERY 24 HOURS
Qty: 7 PATCH | Refills: 0 | Status: SHIPPED | OUTPATIENT
Start: 2023-07-17 | End: 2023-07-24

## 2023-07-17 RX ORDER — METHOCARBAMOL 500 MG/1
250 TABLET, FILM COATED ORAL EVERY 6 HOURS PRN
Qty: 20 TABLET | Refills: 0 | Status: SHIPPED | OUTPATIENT
Start: 2023-07-17 | End: 2023-08-11

## 2023-07-17 RX ORDER — ACETAMINOPHEN 325 MG/1
975 TABLET ORAL EVERY 8 HOURS
Qty: 90 TABLET | Refills: 0 | Status: SHIPPED | OUTPATIENT
Start: 2023-07-17 | End: 2023-07-27

## 2023-07-17 RX ORDER — AMLODIPINE BESYLATE 5 MG/1
2.5 TABLET ORAL DAILY
Qty: 15 TABLET | Refills: 0 | Status: SHIPPED | OUTPATIENT
Start: 2023-07-18 | End: 2023-08-02

## 2023-07-17 RX ADMIN — AMLODIPINE BESYLATE 5 MG: 5 TABLET ORAL at 08:25

## 2023-07-17 RX ADMIN — ACETAMINOPHEN 975 MG: 325 TABLET ORAL at 14:30

## 2023-07-17 RX ADMIN — LIDOCAINE 1 PATCH: 4 PATCH TOPICAL at 08:26

## 2023-07-17 RX ADMIN — METHYLCELLULOSE 500 MG: 500 TABLET ORAL at 08:26

## 2023-07-17 RX ADMIN — HYDRALAZINE HYDROCHLORIDE 20 MG: 20 INJECTION INTRAMUSCULAR; INTRAVENOUS at 04:25

## 2023-07-17 ASSESSMENT — ACTIVITIES OF DAILY LIVING (ADL)
ADLS_ACUITY_SCORE: 35
ADLS_ACUITY_SCORE: 37
ADLS_ACUITY_SCORE: 35
ADLS_ACUITY_SCORE: 37
ADLS_ACUITY_SCORE: 37
ADLS_ACUITY_SCORE: 35

## 2023-07-17 NOTE — PLAN OF CARE
Occupational Therapy Discharge Summary    Reason for therapy discharge:    Discharged to transitional care facility.    Progress towards therapy goal(s). See goals on Care Plan in [facility] electronic health record for goal details.  Goals partially met.  Barriers to achieving goals:   discharge from facility.    Therapy recommendation(s):    Continued therapy is recommended.  Rationale/Recommendations:  to improve overall ADL ind/trnf safety .    Goal Outcome Evaluation:

## 2023-07-17 NOTE — PROGRESS NOTES
PRIMARY DIAGNOSIS: GENERALIZED WEAKNESS    OUTPATIENT/OBSERVATION GOALS TO BE MET BEFORE DISCHARGE  1. Orthostatic performed: No    2. Tolerating PO medications: Yes    3. Return to near baseline physical activity: No    4. Cleared for discharge by consultants (if involved): Yes    Discharge Planner Nurse   Safe discharge environment identified: No  Barriers to discharge: Yes       Entered by: Carlyle Mills RN 07/17/2023 5:05 AM       Pt denies pain. Alert & oriented and able to make needs known. Wearing brace when up.     Hypertensive this morning with BP of 184/81. Non symptomatic. PRN Hydralazine given with follow-up BP of 134/61.

## 2023-07-17 NOTE — DISCHARGE SUMMARY
[facility]  Hospitalist Discharge Summary      Date of Admission:  7/13/2023  Date of Discharge:  7/17/2023  Discharging Provider: Jo Ann Humphrey MD  Discharge Service: Hospitalist Service    Discharge Diagnoses   Principal Problem:    Adult failure to thrive  Active Problems:    Weakness    Diarrhea, unspecified type    Closed wedge compression fracture of T8 vertebra with routine healing    Clinically Significant Risk Factors          Follow-ups Needed After Discharge   Follow-up Appointments     Follow Up and recommended labs and tests      Follow up with custodial physician.  The following labs/tests are   recommended: BMP and CBC.  Neurosurgery in 4 weeks            Unresulted Labs Ordered in the Past 30 Days of this Admission     No orders found from 6/13/2023 to 7/14/2023.          Discharge Disposition   Discharged to short-term care facility  Condition at discharge: Stable    Hospital Course   Ambar Dutton is a 89 year old female admitted on 7/13/2023. She was brought to the ED for evaluation of ongoing back pain, generalized weakness, diarrhea     Failure to thrive  -Lives in senior independent facility and uses walker for ambulation, no recent falls  -Exacerbated by back pain, diarrhea and poor appetite  -SW/PT/OT consulted and recommended TCU     T8 wedge fracture  T11 compression fracture  T12 burst fracture  Pathologic compression fractures of T7, T12, L2-L5  Moderate L2-3 trefoil type central canal stenosis  Mild-moderate L3-4 stenosis  Osteoporosis  -Scheduled acetaminophen and lidocaine patch  -As needed methocarbamol   - changed to low dose oxycodone 2.5mg Q6hrs prn   -Neurosurgery service consulted  -Patient presently not interested in any surgical procedure   MRI also ordered and shows subacute compression fracture with up to 50% loss of height, severe burst fracture T12.    - Brace - wear when upright and out of bed - duration likely 8-12 weeks  - Avoid  bending, twisting, lifting greater than 5-10 pounds  - Osteoporosis eval OP per neurosurgery  Could consider vertebroplasty if pain does not improve. Ambar states she would not want any surgery but not sure if she would want to consider this option.  - NSG clinic follow up in 4 weeks       #Hyponatremia, mild resolved  -Secondary to decreased oral intake      #Urinary tract infection ruled out  -Urine culture  less than 10,000 CFU of mixed ghassan  -Antibiotics discontinued     #Mild hypercalcemia  -Resolved  -Secondary to mild dehydration and oral supplements  -Continue IV fluids   -Ionized calcium within normal limits     #Elevated blood pressure  -No documented history of hypertension and does not take any PTA meds  - pain controled and was still slightly up Amlodipine 5 mg daily started and will send out on 2.5mg daily for now and recheck at TCU and PCP follow up      #Prerenal azotemia  -Resolved with IV hydration  -Avoid nephrotoxins     #Drug-induced platelet defect  -On PTA aspirin  -Monitor for bleeding       Consultations This Hospital Stay   CARE MANAGEMENT / SOCIAL WORK IP CONSULT  PHYSICAL THERAPY ADULT IP CONSULT  OCCUPATIONAL THERAPY ADULT IP CONSULT  NEUROSURGERY IP CONSULT  ORTHOSIS BRACE IP CONSULT  PHYSICAL THERAPY ADULT IP CONSULT  OCCUPATIONAL THERAPY ADULT IP CONSULT    Code Status   Full Code    Time Spent on this Encounter          Jo Ann Humphrey MD  [address] [address]  Phone: [phone]  Fax: [phone]  ______________________________________________________________________    Physical Exam   Vital Signs: Temp: 97.7  F (36.5  C) Temp src: Oral BP: 131/67 Pulse: 79   Resp: 17 SpO2: 95 % O2 Device: None (Room air)    Weight: 111 lbs 5.32 oz  Physical Exam  Constitutional:       Appearance: Normal appearance.   HENT:      Head: Normocephalic and atraumatic.   Cardiovascular:      Rate and Rhythm: Normal rate and regular  rhythm.      Pulses: Normal pulses.   Pulmonary:      Effort: Pulmonary effort is normal.      Breath sounds: Normal breath sounds.   Abdominal:      General: Bowel sounds are normal.      Palpations: Abdomen is soft.   Musculoskeletal:         General: Normal range of motion.   Skin:     General: Skin is warm and dry.      Capillary Refill: Capillary refill takes less than 2 seconds.   Neurological:      General: No focal deficit present.      Mental Status: She is alert and oriented to person, place, and time. Mental status is at baseline.      Cranial Nerves: No cranial nerve deficit.      Sensory: No sensory deficit.      Motor: No weakness.              Primary Care Physician   Mary Barnes    Discharge Orders      General info for SNF    Length of Stay Estimate: Short Term Care: Estimated # of Days <30  Condition at Discharge: Stable  Level of care:skilled   Rehabilitation Potential: Good  Admission H&P remains valid and up-to-date: Yes  Recent Chemotherapy: N/A  Use Nursing Home Standing Orders: Yes     Mantoux instructions    Give two-step Mantoux (PPD) Per Facility Policy Yes     Follow Up and recommended labs and tests    Follow up with intermediate physician.  The following labs/tests are recommended: BMP and CBC.  Neurosurgery in 4 weeks     Reason for your hospital stay    Principal Problem:    Adult failure to thrive  Active Problems:    Weakness    Diarrhea, unspecified type    Closed wedge compression fracture of T8 vertebra with routine healing     Activity - Up with assistive device    Brace - wear when upright and out of bed - duration likely 8-12 weeks  Avoid bending, twisting, lifting greater than 5-10 pounds     Weight bearing status    WBAT     Physical Therapy Adult Consult    Evaluate and treat as clinically indicated.    Reason:  weakness     Occupational Therapy Adult Consult    Evaluate and treat as clinically indicated.    Reason:  weakness     Fall precautions     Diet    Regular diet        Significant Results and Procedures   Most Recent 3 CBC's:Recent Labs   Lab Test 07/13/23 2121 02/08/23  0951 11/21/22  1104   WBC 9.5 6.1 11.3*   HGB 12.9 13.7 13.6   MCV 93 94 96    266 277     Most Recent 3 BMP's:Recent Labs   Lab Test 07/15/23  0745 07/14/23  1202 07/13/23 2121    139 135*   POTASSIUM 3.8 4.5 4.2   CHLORIDE 102 100 98   CO2 29 29 24   BUN 15.8 17.2 23.7*   CR 0.79 0.72 0.89   ANIONGAP 7 10 13   BETTY 9.2 10.1* 10.1*   GLC 83 109* 145*     Most Recent 2 LFT's:Recent Labs   Lab Test 07/13/23 2121 01/27/21  0916   AST 34 25   ALT 27 19   ALKPHOS 166* 108   BILITOTAL 0.3 0.6   ,   Results for orders placed or performed during the hospital encounter of 07/13/23   MR Thoracic Spine w/o Contrast    Narrative    EXAM: MR THORACIC SPINE W/O CONTRAST  LOCATION: [facility]  DATE: 7/14/2023    INDICATION: Without contrast only. T8 fracture. Evaluate whether any acute portion or completely chronic.  COMPARISON: 7/2/2023  TECHNIQUE: Routine Thoracic Spine MRI without IV contrast.    FINDINGS:   There is a moderate compression deformity of T11 with diffuse marrow edema and up to 50% loss of height centrally. This is new when compared to 7/2/2023 and is consistent with an acute/early subacute compression fracture. Moderate anterior wedging   fracture at T8 stable and favored to be late subacute or chronic. Deformity of C7. Severe chronic burst-type fracture of T12. Mild to moderate multilevel disc desiccation. Disc heights are relatively preserved. Annular bulges at T7-T8, T8-T9, at T12-L1   and L1-L2. No high-grade central canal stenosis. Mild to moderate multilevel neural foraminal narrowing. Chronic compression deformities at L2.    COPD.      Impression    IMPRESSION:  1.  Moderate acute/early subacute compression fracture of T11 with up to 50% loss of height centrally.  2.  Moderate anterior wedging fracture of T8 favored to be late subacute or chronic.  3.   Severe chronic burst-type fracture of T12.  4.  No high-grade central canal stenosis or neural foraminal narrowing at any level.     XR Thoracic Spine 2 Views    Narrative    EXAM: XR THORACIC SPINE 2 VIEWS  LOCATION: [facility]  DATE: 7/15/2023    INDICATION: AP LAT upright in brace; Eval T8, T11 fracture stability  COMPARISON: CT 07/02/2023 MRI 07/14/2023      Impression    IMPRESSION: Exaggerated thoracic kyphosis. Redemonstrated is an acute anterior compression fractures of the T11 vertebra. There is approximately 50% vertebral body height loss which is unchanged from the comparison. The probable subacute anterior   compression fracture of the T8 vertebra with approximately 70% vertebral body height loss is unchanged from comparison MRI. Chronic T7 and T12 fractures are unchanged. The bones are demineralized. Moderate spondylosis. Pulmonary hyperinflation compatible   with COPD.       Discharge Medications   Current Discharge Medication List      START taking these medications    Details   acetaminophen (TYLENOL) 325 MG tablet Take 3 tablets (975 mg) by mouth every 8 hours for 10 days  Qty: 90 tablet, Refills: 0    Associated Diagnoses: Closed wedge compression fracture of T8 vertebra with routine healing      amLODIPine (NORVASC) 5 MG tablet Take 0.5 tablets (2.5 mg) by mouth daily for 30 days  Qty: 15 tablet, Refills: 0    Associated Diagnoses: Benign essential hypertension      Lidocaine (LIDOCARE) 4 % Patch Place 1 patch onto the skin every 24 hours for 7 days To prevent lidocaine toxicity, patient should be patch free for 12 hrs daily.  Qty: 7 patch, Refills: 0    Associated Diagnoses: Closed wedge compression fracture of T8 vertebra with routine healing      methocarbamol (ROBAXIN) 500 MG tablet Take 0.5 tablets (250 mg) by mouth every 6 hours as needed for muscle spasms or other (Pain adjuvant)  Qty: 20 tablet, Refills: 0    Associated Diagnoses: Closed wedge compression fracture  of T8 vertebra with routine healing      oxyCODONE (ROXICODONE) 5 MG tablet Take 0.5 tablets (2.5 mg) by mouth every 6 hours as needed for moderate to severe pain  Qty: 10 tablet, Refills: 0    Associated Diagnoses: Closed wedge compression fracture of T8 vertebra with routine healing         CONTINUE these medications which have NOT CHANGED    Details   ascorbic acid, vitamin C, (VITAMIN C) 500 MG tablet Take 1,000 mg by mouth daily      aspirin 81 MG EC tablet Take 81 mg by mouth daily      calcium carbonate 500 mg, elemental, 1250 (500 Ca) MG tablet chewable Take 500 mg by mouth as needed      calcium carbonate-vitamin D3 600 mg(1,500mg) -200 unit per tablet Take 1 tablet by mouth daily      calcium polycarbophil (FIBERCON) 625 MG tablet Take 2 tablets by mouth daily      fish oil-omega-3 fatty acids 1000 MG capsule Take 1 g by mouth daily      meclizine (ANTIVERT) 25 mg tablet Take 25 mg by mouth daily as needed       methylcellulose (CITRUCEL) 500 MG TABS tablet Take 500 mg by mouth daily      Multiple Vitamins-Minerals (PRESERVISION AREDS 2) CAPS Take 2 capsules by mouth daily      niacin 250 MG tablet [NIACIN 250 MG TABLET] Take 250 mg by mouth daily with breakfast.      polyethylene glycol (MIRALAX) 17 gram packet Take 17 g by mouth daily as needed for constipation      senna-docusate (SENOKOT-S/PERICOLACE) 8.6-50 MG tablet Take 1 tablet by mouth daily as needed for constipation           Allergies   Allergies   Allergen Reactions     Simvastatin Muscle Pain (Myalgia)     Simvastatin and atorvastatin caused arm tingling and possible muscle discomfort.

## 2023-07-17 NOTE — PLAN OF CARE
PRIMARY DIAGNOSIS: ACUTE PAIN  OUTPATIENT/OBSERVATION GOALS TO BE MET BEFORE DISCHARGE:  1. Pain Status: Improved-controlled with oral pain medications.    2. Return to near baseline physical activity: Yes    3. Cleared for discharge by consultants (if involved): Yes    Discharge Planner Nurse   Safe discharge environment identified: Yes  Barriers to discharge: Yes       Entered by: Thanh Mendez RN 07/17/2023 9:47 AM     Please review provider order for any additional goals.   Nurse to notify provider when observation goals have been met and patient is ready for discharge.Goal Outcome Evaluation:

## 2023-07-17 NOTE — PROGRESS NOTES
"Care Management Follow Up    Length of Stay (days): 0    Expected Discharge Date: 07/17/2023     Concerns to be Addressed:     Care Progression  Patient plan of care discussed at interdisciplinary rounds: Yes    Anticipated Discharge Disposition:  TCU      Anticipated Discharge Services:  None  Anticipated Discharge DME:  None    Patient/family educated on Medicare website which has current facility and service quality ratings:  yes  Education Provided on the Discharge Plan:  Yes  Patient/Family in Agreement with the Plan:  yes    Referrals Placed by CM/SW:  Post Acute  Private pay costs discussed: Not applicable    Additional Information:  RNCM discussed plan with patient's daughter via phone.     RNCM placed calls to TCUs, destinations tab updated with comments.   Awaiting follow up from TCU.     Social HX: \"Patient is  and lives alone in a senior independent living apartment. Both patient and Annette stated that they will be looking for patient to move to an assisted living facility.\"    CM will continue to follow care progression and aide in discharge planning as needed.     10:25 AM - RNCM received update accepted to [facility] and [facility]. RNCM Placed call and discussed with daughter Annette, accepting [facility] TCU placement, plan for family transport.     10:43 AM - [id number]    Marie Edwards RN        "

## 2023-07-17 NOTE — PLAN OF CARE
PRIMARY DIAGNOSIS: ACUTE PAIN  OUTPATIENT/OBSERVATION GOALS TO BE MET BEFORE DISCHARGE:  1. Pain Status: Improved-controlled with oral pain medications.    2. Return to near baseline physical activity: Yes    3. Cleared for discharge by consultants (if involved): Yes    Discharge Planner Nurse   Safe discharge environment identified: Yes  Barriers to discharge: Yes       Entered by: Thanh Mendez RN 07/17/2023 1:07 PM     Please review provider order for any additional goals.   Nurse to notify provider when observation goals have been met and patient is ready for discharge.Goal Outcome Evaluation:

## 2023-07-17 NOTE — PROGRESS NOTES
Care Management Discharge Note    Discharge Date: 07/17/2023       Discharge Disposition: Transitional Care    Discharge Services: None    Discharge DME: None    Discharge Transportation: family or friend will provide    Private pay costs discussed: Not applicable    Does the patient's insurance plan have a 3 day qualifying hospital stay waiver?  Yes   Will the waiver be used for post-acute placement? Yes    PAS Confirmation Code: [id number] ([id number])  Patient/family educated on Medicare website which has current facility and service quality ratings: yes    Education Provided on the Discharge Plan: Yes  Persons Notified of Discharge Plans: Patient/ Family./ Care Team/ Facility  Patient/Family in Agreement with the Plan: yes    Handoff Referral Completed: Yes    Additional Information:  Patient to discharge via family transport to [facility].     Marie Edwards RN

## 2023-07-17 NOTE — TELEPHONE ENCOUNTER
Date: 7/17/2023  (Provide the date when patient was called)  Provider: ZULEMA  (with whom patient needs to schedule with)  Detailed message: 4 wks follow up wih XR prior on ZULEMA wilda for T11 Fracture , will call once patient has been discharge - tkv

## 2023-07-17 NOTE — PROGRESS NOTES
[facility] GERIATRIC SERVICES    Code Status:  FULL CODE   Visit Type:   Chief Complaint   Patient presents with     TCU Admission     [facility] 7/13/2023 - 7/17/2023     Facility:  [facility] ([facility]) [[id number]]         HPI: Ambar Dutton is a 89 year old female who I am seeing today for admit to the TCU.  Patient recent hospitalized on 7/13/2023 with weakness and T8 compression fracture.  Past medical history includes acute on chronic back pain, osteoporosis and constipation.  Patient with increasing back pain, diarrhea and poor appetite.  Patient found to have a T8 wedge fracture, T11 compression fracture and T12 burst fracture.  Pathological compression fractures at T7, T12, L2 and L5 with moderate L2-L3 central canal stenosis and mild to moderate L3-L4 stenosis.  An MRI showed subacute compression fracture with up to 50% loss of height with severe burst fracture of T12.  Patient seen by neurosurgery placed in a brace for likely duration of 8 to 12 weeks.  Patient with history of osteoporosis.  Recommendation for outpatient osteoporosis evaluation with possible vertebroplasty pain does not improve.  Hyponatremia improved with fluids.  Urinary tract infection ruled out.  Urine culture less than 10,000 explore.  Mild hypercalcemia improved with fluids.  Elevated blood pressure with no documented history of hypertension.  Patient started on amlodipine 5 mg daily and reduced to 2.5 mg at discharge.  Prerenal azotemia improved with fluids.  Drug-induced platelet defect.  Patient on prior to admit aspirin.    Transitional Care Course: Today patient sitting up in bedside chair.  Patient very pleasant.  She continues with pain in her spine.  She reports no pain while sitting however she does have pain with movement.  She continues on Tylenol and oxycodone.  Lidocaine patch as well.  She is moving quite well.  She tells me she has not had any health problems did not take medication prior to this hospitalization.   Blood pressure appears satisfactory.  She denies any dizziness or headache.  No shortness of breath or chest pain.  Her appetite is improving and she is having regular bowel movements.      Assessment/Plan:     1. Closed wedge compression fracture of T8 vertebra with routine healing  -Multiple pathologic compression fractures at T7, T12, L4-L5  -T12 burst fracture  -T11 compression fracture  -T8 wedge fracture  -Patient continues in sling for duration of 8 to 12 weeks.  -Avoid bending, twisting, lifting to 10 pounds.  -Consider vertebroplasty if pain is not improved.  -Follow-up with neurosurgery in 4 weeks.  -Tylenol, oxycodone and lidocaine patch for pain.    2. Age-related osteoporosis with current pathological fracture with routine healing, subsequent encounter  -Recommended osteoporosis work-up outpatient.    3. Spinal stenosis of lumbar region without neurogenic claudication  -See above.    4. Hypertension, unspecified type  -Patient started on amlodipine 2.5 mg daily during hospitalization.  -Blood pressure appears satisfactory.    5.  Drug-induced platelet defect  -Patient taking aspirin prior to admit.  -No evidence of bleed.  -Monitor platelets.     Okay PT OT eval and treat.  Follow up CBC and BMP.     Active Ambulatory Problems     Diagnosis Date Noted     ACP (advance care planning) 04/15/2014     Anxiety disorder 07/08/2020     Chest pain 04/29/2012     Hypercholesteremia 07/08/2020     Low back pain 03/06/2013     Osteoporosis 07/08/2020     Spinal stenosis of lumbar region 07/08/2020     Stress-induced cardiomyopathy 07/08/2020     Weakness 07/13/2023     Diarrhea 07/20/2022     Closed wedge compression fracture of T8 vertebra with routine healing 07/14/2023     Adult failure to thrive 07/14/2023     Constipation 07/17/2023     Resolved Ambulatory Problems     Diagnosis Date Noted     No Resolved Ambulatory Problems     No Additional Past Medical History     Allergies   Allergen Reactions      Simvastatin Muscle Pain (Myalgia)     Simvastatin and atorvastatin caused arm tingling and possible muscle discomfort.       All Meds and Allergies reviewed in the record at the facility and is the most up-to-date.    Post Discharge Medication Reconciliation Status: discharge medications reconciled, continue medications without change  Current Outpatient Medications   Medication Sig     acetaminophen (TYLENOL) 325 MG tablet Take 3 tablets (975 mg) by mouth every 8 hours for 10 days     amLODIPine (NORVASC) 5 MG tablet Take 0.5 tablets (2.5 mg) by mouth daily for 30 days     ascorbic acid, vitamin C, (VITAMIN C) 500 MG tablet Take 1,000 mg by mouth daily     aspirin 81 MG EC tablet Take 81 mg by mouth daily     calcium carbonate 500 mg, elemental, 1250 (500 Ca) MG tablet chewable Take 500 mg by mouth as needed     calcium carbonate-vitamin D3 600 mg(1,500mg) -200 unit per tablet Take 1 tablet by mouth daily     calcium polycarbophil (FIBERCON) 625 MG tablet Take 2 tablets by mouth daily     fish oil-omega-3 fatty acids 1000 MG capsule Take 1 g by mouth daily     Lidocaine (LIDOCARE) 4 % Patch Place 1 patch onto the skin every 24 hours for 7 days To prevent lidocaine toxicity, patient should be patch free for 12 hrs daily.     meclizine (ANTIVERT) 25 mg tablet Take 25 mg by mouth daily as needed      methocarbamol (ROBAXIN) 500 MG tablet Take 0.5 tablets (250 mg) by mouth every 6 hours as needed for muscle spasms or other (Pain adjuvant)     methylcellulose (CITRUCEL) 500 MG TABS tablet Take 500 mg by mouth daily     Multiple Vitamins-Minerals (PRESERVISION AREDS 2) CAPS Take 2 capsules by mouth daily     niacin 250 MG tablet [NIACIN 250 MG TABLET] Take 250 mg by mouth daily with breakfast.     oxyCODONE (ROXICODONE) 5 MG tablet Take 0.5 tablets (2.5 mg) by mouth every 6 hours as needed for moderate to severe pain     polyethylene glycol (MIRALAX) 17 gram packet Take 17 g by mouth daily as needed for constipation      senna-docusate (SENOKOT-S/PERICOLACE) 8.6-50 MG tablet Take 1 tablet by mouth daily as needed for constipation     No current facility-administered medications for this visit.       REVIEW OF SYSTEMS:   10 point review of systems reviewed and pertinent positives in the HPI.     PHYSICAL EXAMINATION:  Physical Exam     Vital signs: BP (!) 151/70   Pulse 81   Temp 98  F (36.7  C)   Resp 16   Ht 1.524 m (5')   SpO2 96%   BMI 21.74 kg/m    General: Awake, Alert, oriented x3, sitting up in bedside chair, appropriately, follows simple commands, conversant  HEENT: Pink conjunctiva,  moist oral mucosa  NECK: Supple  CVS:  S1  S2, without murmur or gallop.   LUNG: Clear to auscultation, No wheezes, rales or rhonci.  BACK: No kyphosis of the thoracic spine.  Splinting in place.  ABDOMEN: Soft, nontender to palpation, with positive bowel sounds  EXTREMITIES: Moves both upper and lower extremities, no pedal edema, no calf tenderness  SKIN: Warm and dry  NEUROLOGIC: Intact, pulses palpable  PSYCHIATRIC: Pleasant mood.      Labs:  All labs reviewed in the nursing home record and Epic   @  Lab Results   Component Value Date    WBC 9.5 07/13/2023     Lab Results   Component Value Date    RBC 4.19 07/13/2023     Lab Results   Component Value Date    HGB 12.9 07/13/2023     Lab Results   Component Value Date    HCT 39.1 07/13/2023     Lab Results   Component Value Date    MCV 93 07/13/2023     Lab Results   Component Value Date    MCH 30.8 07/13/2023     Lab Results   Component Value Date    MCHC 33.0 07/13/2023     Lab Results   Component Value Date    RDW 13.8 07/13/2023     Lab Results   Component Value Date     07/13/2023        @Last Comprehensive Metabolic Panel:  Sodium   Date Value Ref Range Status   07/18/2023 138 136 - 145 mmol/L Final     Potassium   Date Value Ref Range Status   07/18/2023 4.4 3.4 - 5.3 mmol/L Final   01/28/2022 4.4 3.5 - 5.0 mmol/L Final     Chloride   Date Value Ref Range Status    07/18/2023 104 98 - 107 mmol/L Final   01/28/2022 103 98 - 107 mmol/L Final     Carbon Dioxide (CO2)   Date Value Ref Range Status   07/18/2023 24 22 - 29 mmol/L Final   01/28/2022 25 22 - 31 mmol/L Final     Anion Gap   Date Value Ref Range Status   07/18/2023 10 7 - 15 mmol/L Final   01/28/2022 12 5 - 18 mmol/L Final     Glucose   Date Value Ref Range Status   07/18/2023 71 70 - 99 mg/dL Final   01/28/2022 95 70 - 125 mg/dL Final     Urea Nitrogen   Date Value Ref Range Status   07/18/2023 32.6 (H) 8.0 - 23.0 mg/dL Final   01/28/2022 21 8 - 28 mg/dL Final     Creatinine   Date Value Ref Range Status   07/18/2023 0.80 0.51 - 0.95 mg/dL Final     GFR Estimate   Date Value Ref Range Status   07/18/2023 70 >60 mL/min/1.73m2 Final   01/27/2021 56 (L) >60 mL/min/1.73m2 Final     Calcium   Date Value Ref Range Status   07/18/2023 9.2 8.2 - 9.6 mg/dL Final     45 minutes spent for this visit which included preparing for the visit, reviewing hospital records, imaging, consults, medications, laboratory as well as face-to-face time spent with patient and collaborating with nursing staff.     At the conclusion of the encounter I discussed  the results of all of the tests and the disposition with patient.   All questions were answered.  The patient acknowledged understanding and was involved in the decision making regarding the overall care plan.        This note has been dictated using voice recognition software. Any grammatical or context distortions are unintentional and inherent to the software    Electronically signed by: Zakiya Fishman, CNP

## 2023-07-17 NOTE — PROGRESS NOTES
Physical Therapy Discharge Summary    Reason for therapy discharge:    Discharged to transitional care facility.    Progress towards therapy goal(s). See goals on Care Plan in [facility] electronic health record for goal details.  Goals not met.  Barriers to achieving goals:   discharge from facility.    Therapy recommendation(s):    Continued therapy is recommended.  Rationale/Recommendations:  TCU to progress functional mobility.

## 2023-07-17 NOTE — PROGRESS NOTES
PRIMARY DIAGNOSIS: GENERALIZED WEAKNESS    OUTPATIENT/OBSERVATION GOALS TO BE MET BEFORE DISCHARGE  1. Orthostatic performed: No    2. Tolerating PO medications: Yes    3. Return to near baseline physical activity: No    4. Cleared for discharge by consultants (if involved): Yes    Discharge Planner Nurse   Safe discharge environment identified: No  Barriers to discharge: Yes. TCU placement.        Entered by: Carlyle Mills RN 07/16/2023 9:43 PM

## 2023-07-18 ENCOUNTER — TRANSITIONAL CARE UNIT VISIT (OUTPATIENT)
Dept: GERIATRICS | Facility: CLINIC | Age: 88
End: 2023-07-18
Payer: MEDICARE

## 2023-07-18 VITALS
DIASTOLIC BLOOD PRESSURE: 70 MMHG | BODY MASS INDEX: 21.74 KG/M2 | HEART RATE: 81 BPM | TEMPERATURE: 98 F | RESPIRATION RATE: 16 BRPM | OXYGEN SATURATION: 96 % | HEIGHT: 60 IN | SYSTOLIC BLOOD PRESSURE: 151 MMHG

## 2023-07-18 DIAGNOSIS — M80.00XD AGE-RELATED OSTEOPOROSIS WITH CURRENT PATHOLOGICAL FRACTURE WITH ROUTINE HEALING, SUBSEQUENT ENCOUNTER: ICD-10-CM

## 2023-07-18 DIAGNOSIS — M48.061 SPINAL STENOSIS OF LUMBAR REGION WITHOUT NEUROGENIC CLAUDICATION: ICD-10-CM

## 2023-07-18 DIAGNOSIS — I10 HYPERTENSION, UNSPECIFIED TYPE: ICD-10-CM

## 2023-07-18 DIAGNOSIS — S22.060D CLOSED WEDGE COMPRESSION FRACTURE OF T8 VERTEBRA WITH ROUTINE HEALING: Primary | ICD-10-CM

## 2023-07-18 DIAGNOSIS — S22.060D CLOSED WEDGE COMPRESSION FRACTURE OF T8 VERTEBRA WITH ROUTINE HEALING: ICD-10-CM

## 2023-07-18 LAB
ANION GAP SERPL CALCULATED.3IONS-SCNC: 10 MMOL/L (ref 7–15)
BUN SERPL-MCNC: 32.6 MG/DL (ref 8–23)
CALCIUM SERPL-MCNC: 9.2 MG/DL (ref 8.2–9.6)
CHLORIDE SERPL-SCNC: 104 MMOL/L (ref 98–107)
CREAT SERPL-MCNC: 0.8 MG/DL (ref 0.51–0.95)
DEPRECATED HCO3 PLAS-SCNC: 24 MMOL/L (ref 22–29)
ERYTHROCYTE [DISTWIDTH] IN BLOOD BY AUTOMATED COUNT: 14.4 % (ref 10–15)
GFR SERPL CREATININE-BSD FRML MDRD: 70 ML/MIN/1.73M2
GLUCOSE SERPL-MCNC: 71 MG/DL (ref 70–99)
HCT VFR BLD AUTO: 36.5 % (ref 35–47)
HGB BLD-MCNC: 11.8 G/DL (ref 11.7–15.7)
MCH RBC QN AUTO: 31.2 PG (ref 26.5–33)
MCHC RBC AUTO-ENTMCNC: 32.3 G/DL (ref 31.5–36.5)
MCV RBC AUTO: 97 FL (ref 78–100)
PLATELET # BLD AUTO: 286 10E3/UL (ref 150–450)
POTASSIUM SERPL-SCNC: 4.4 MMOL/L (ref 3.4–5.3)
RBC # BLD AUTO: 3.78 10E6/UL (ref 3.8–5.2)
SODIUM SERPL-SCNC: 138 MMOL/L (ref 136–145)
WBC # BLD AUTO: 5.5 10E3/UL (ref 4–11)

## 2023-07-18 PROCEDURE — 99310 SBSQ NF CARE HIGH MDM 45: CPT | Performed by: NURSE PRACTITIONER

## 2023-07-18 PROCEDURE — 85027 COMPLETE CBC AUTOMATED: CPT | Performed by: NURSE PRACTITIONER

## 2023-07-18 PROCEDURE — 36415 COLL VENOUS BLD VENIPUNCTURE: CPT | Performed by: NURSE PRACTITIONER

## 2023-07-18 PROCEDURE — P9603 ONE-WAY ALLOW PRORATED MILES: HCPCS | Performed by: NURSE PRACTITIONER

## 2023-07-18 PROCEDURE — 80048 BASIC METABOLIC PNL TOTAL CA: CPT | Performed by: NURSE PRACTITIONER

## 2023-07-18 RX ORDER — OXYCODONE HYDROCHLORIDE 5 MG/1
2.5 TABLET ORAL EVERY 6 HOURS PRN
Qty: 30 TABLET | Refills: 0 | Status: SHIPPED | OUTPATIENT
Start: 2023-07-18 | End: 2023-08-11

## 2023-07-18 NOTE — LETTER
7/18/2023        RE: Ambar Dutton  [address]  [address]  [place] [address]        [facility]    Code Status:  FULL CODE   Visit Type:   Chief Complaint   Patient presents with     TCU Admission     [facility] 7/13/2023 - 7/17/2023     Facility:  [facility] ([facility]) [[id number]]         HPI: Ambar Dutton is a 89 year old female who I am seeing today for admit to the TCU.  Patient recent hospitalized on 7/13/2023 with weakness and T8 compression fracture.  Past medical history includes acute on chronic back pain, osteoporosis and constipation.  Patient with increasing back pain, diarrhea and poor appetite.  Patient found to have a T8 wedge fracture, T11 compression fracture and T12 burst fracture.  Pathological compression fractures at T7, T12, L2 and L5 with moderate L2-L3 central canal stenosis and mild to moderate L3-L4 stenosis.  An MRI showed subacute compression fracture with up to 50% loss of height with severe burst fracture of T12.  Patient seen by neurosurgery placed in a brace for likely duration of 8 to 12 weeks.  Patient with history of osteoporosis.  Recommendation for outpatient osteoporosis evaluation with possible vertebroplasty pain does not improve.  Hyponatremia improved with fluids.  Urinary tract infection ruled out.  Urine culture less than 10,000 explore.  Mild hypercalcemia improved with fluids.  Elevated blood pressure with no documented history of hypertension.  Patient started on amlodipine 5 mg daily and reduced to 2.5 mg at discharge.  Prerenal azotemia improved with fluids.  Drug-induced platelet defect.  Patient on prior to admit aspirin.    Transitional Care Course: Today patient sitting up in bedside chair.  Patient very pleasant.  She continues with pain in her spine.  She reports no pain while sitting however she does have pain with movement.  She continues on Tylenol and oxycodone.  Lidocaine patch as well.  She is moving quite well.  She  tells me she has not had any health problems did not take medication prior to this hospitalization.  Blood pressure appears satisfactory.  She denies any dizziness or headache.  No shortness of breath or chest pain.  Her appetite is improving and she is having regular bowel movements.      Assessment/Plan:     1. Closed wedge compression fracture of T8 vertebra with routine healing  -Multiple pathologic compression fractures at T7, T12, L4-L5  -T12 burst fracture  -T11 compression fracture  -T8 wedge fracture  -Patient continues in sling for duration of 8 to 12 weeks.  -Avoid bending, twisting, lifting to 10 pounds.  -Consider vertebroplasty if pain is not improved.  -Follow-up with neurosurgery in 4 weeks.  -Tylenol, oxycodone and lidocaine patch for pain.    2. Age-related osteoporosis with current pathological fracture with routine healing, subsequent encounter  -Recommended osteoporosis work-up outpatient.    3. Spinal stenosis of lumbar region without neurogenic claudication  -See above.    4. Hypertension, unspecified type  -Patient started on amlodipine 2.5 mg daily during hospitalization.  -Blood pressure appears satisfactory.    5.  Drug-induced platelet defect  -Patient taking aspirin prior to admit.  -No evidence of bleed.  -Monitor platelets.     Okay PT OT eval and treat.  Follow up CBC and BMP.     Active Ambulatory Problems     Diagnosis Date Noted     ACP (advance care planning) 04/15/2014     Anxiety disorder 07/08/2020     Chest pain 04/29/2012     Hypercholesteremia 07/08/2020     Low back pain 03/06/2013     Osteoporosis 07/08/2020     Spinal stenosis of lumbar region 07/08/2020     Stress-induced cardiomyopathy 07/08/2020     Weakness 07/13/2023     Diarrhea 07/20/2022     Closed wedge compression fracture of T8 vertebra with routine healing 07/14/2023     Adult failure to thrive 07/14/2023     Constipation 07/17/2023     Resolved Ambulatory Problems     Diagnosis Date Noted     No Resolved  Ambulatory Problems     No Additional Past Medical History     Allergies   Allergen Reactions     Simvastatin Muscle Pain (Myalgia)     Simvastatin and atorvastatin caused arm tingling and possible muscle discomfort.       All Meds and Allergies reviewed in the record at the facility and is the most up-to-date.    Post Discharge Medication Reconciliation Status: discharge medications reconciled, continue medications without change  Current Outpatient Medications   Medication Sig     acetaminophen (TYLENOL) 325 MG tablet Take 3 tablets (975 mg) by mouth every 8 hours for 10 days     amLODIPine (NORVASC) 5 MG tablet Take 0.5 tablets (2.5 mg) by mouth daily for 30 days     ascorbic acid, vitamin C, (VITAMIN C) 500 MG tablet Take 1,000 mg by mouth daily     aspirin 81 MG EC tablet Take 81 mg by mouth daily     calcium carbonate 500 mg, elemental, 1250 (500 Ca) MG tablet chewable Take 500 mg by mouth as needed     calcium carbonate-vitamin D3 600 mg(1,500mg) -200 unit per tablet Take 1 tablet by mouth daily     calcium polycarbophil (FIBERCON) 625 MG tablet Take 2 tablets by mouth daily     fish oil-omega-3 fatty acids 1000 MG capsule Take 1 g by mouth daily     Lidocaine (LIDOCARE) 4 % Patch Place 1 patch onto the skin every 24 hours for 7 days To prevent lidocaine toxicity, patient should be patch free for 12 hrs daily.     meclizine (ANTIVERT) 25 mg tablet Take 25 mg by mouth daily as needed      methocarbamol (ROBAXIN) 500 MG tablet Take 0.5 tablets (250 mg) by mouth every 6 hours as needed for muscle spasms or other (Pain adjuvant)     methylcellulose (CITRUCEL) 500 MG TABS tablet Take 500 mg by mouth daily     Multiple Vitamins-Minerals (PRESERVISION AREDS 2) CAPS Take 2 capsules by mouth daily     niacin 250 MG tablet [NIACIN 250 MG TABLET] Take 250 mg by mouth daily with breakfast.     oxyCODONE (ROXICODONE) 5 MG tablet Take 0.5 tablets (2.5 mg) by mouth every 6 hours as needed for moderate to severe pain      polyethylene glycol (MIRALAX) 17 gram packet Take 17 g by mouth daily as needed for constipation     senna-docusate (SENOKOT-S/PERICOLACE) 8.6-50 MG tablet Take 1 tablet by mouth daily as needed for constipation     No current facility-administered medications for this visit.       REVIEW OF SYSTEMS:   10 point review of systems reviewed and pertinent positives in the HPI.     PHYSICAL EXAMINATION:  Physical Exam     Vital signs: BP (!) 151/70   Pulse 81   Temp 98  F (36.7  C)   Resp 16   Ht 1.524 m (5')   SpO2 96%   BMI 21.74 kg/m    General: Awake, Alert, oriented x3, sitting up in bedside chair, appropriately, follows simple commands, conversant  HEENT: Pink conjunctiva,  moist oral mucosa  NECK: Supple  CVS:  S1  S2, without murmur or gallop.   LUNG: Clear to auscultation, No wheezes, rales or rhonci.  BACK: No kyphosis of the thoracic spine.  Splinting in place.  ABDOMEN: Soft, nontender to palpation, with positive bowel sounds  EXTREMITIES: Moves both upper and lower extremities, no pedal edema, no calf tenderness  SKIN: Warm and dry  NEUROLOGIC: Intact, pulses palpable  PSYCHIATRIC: Pleasant mood.      Labs:  All labs reviewed in the nursing home record and Epic   @  Lab Results   Component Value Date    WBC 9.5 07/13/2023     Lab Results   Component Value Date    RBC 4.19 07/13/2023     Lab Results   Component Value Date    HGB 12.9 07/13/2023     Lab Results   Component Value Date    HCT 39.1 07/13/2023     Lab Results   Component Value Date    MCV 93 07/13/2023     Lab Results   Component Value Date    MCH 30.8 07/13/2023     Lab Results   Component Value Date    MCHC 33.0 07/13/2023     Lab Results   Component Value Date    RDW 13.8 07/13/2023     Lab Results   Component Value Date     07/13/2023        @Last Comprehensive Metabolic Panel:  Sodium   Date Value Ref Range Status   07/18/2023 138 136 - 145 mmol/L Final     Potassium   Date Value Ref Range Status   07/18/2023 4.4 3.4 - 5.3 mmol/L  Final   01/28/2022 4.4 3.5 - 5.0 mmol/L Final     Chloride   Date Value Ref Range Status   07/18/2023 104 98 - 107 mmol/L Final   01/28/2022 103 98 - 107 mmol/L Final     Carbon Dioxide (CO2)   Date Value Ref Range Status   07/18/2023 24 22 - 29 mmol/L Final   01/28/2022 25 22 - 31 mmol/L Final     Anion Gap   Date Value Ref Range Status   07/18/2023 10 7 - 15 mmol/L Final   01/28/2022 12 5 - 18 mmol/L Final     Glucose   Date Value Ref Range Status   07/18/2023 71 70 - 99 mg/dL Final   01/28/2022 95 70 - 125 mg/dL Final     Urea Nitrogen   Date Value Ref Range Status   07/18/2023 32.6 (H) 8.0 - 23.0 mg/dL Final   01/28/2022 21 8 - 28 mg/dL Final     Creatinine   Date Value Ref Range Status   07/18/2023 0.80 0.51 - 0.95 mg/dL Final     GFR Estimate   Date Value Ref Range Status   07/18/2023 70 >60 mL/min/1.73m2 Final   01/27/2021 56 (L) >60 mL/min/1.73m2 Final     Calcium   Date Value Ref Range Status   07/18/2023 9.2 8.2 - 9.6 mg/dL Final     45 minutes spent for this visit which included preparing for the visit, reviewing hospital records, imaging, consults, medications, laboratory as well as face-to-face time spent with patient and collaborating with nursing staff.     At the conclusion of the encounter I discussed  the results of all of the tests and the disposition with patient.   All questions were answered.  The patient acknowledged understanding and was involved in the decision making regarding the overall care plan.        This note has been dictated using voice recognition software. Any grammatical or context distortions are unintentional and inherent to the software    Electronically signed by: Zakiya Fishman CNP         Sincerely,        Zakiya Fishman NP

## 2023-07-19 ENCOUNTER — PATIENT OUTREACH (OUTPATIENT)
Dept: CARE COORDINATION | Facility: CLINIC | Age: 88
End: 2023-07-19
Payer: MEDICARE

## 2023-07-19 NOTE — PROGRESS NOTES
Clinic Care Coordination Contact  Care Coordination Transition Communication         Clinical Data: Patient was hospitalized at [facility] VERONIQUE Dutton is a 89 year old female who I am seeing today for admit to the TCU.  Patient recent hospitalized on 7/13/2023 with weakness and T8 compression fracture.  Past medical history includes acute on chronic back pain, osteoporosis and constipation.  Patient with increasing back pain, diarrhea and poor appetite.  Patient found to have a T8 wedge fracture, T11 compression fracture and T12 burst fracture.  Pathological compression fractures at T7, T12, L2 and L5 with moderate L2-L3 central canal stenosis and mild to moderate L3-L4 stenosis.  An MRI showed subacute compression fracture with up to 50% loss of height with severe burst fracture of T12.  Patient seen by neurosurgery placed in a brace for likely duration of 8 to 12 weeks.  Patient with history of osteoporosis.  Recommendation for outpatient osteoporosis evaluation with possible vertebroplasty pain does not improve.  Hyponatremia improved with fluids.  Urinary tract infection ruled out.  Urine culture less than 10,000 explore.  Mild hypercalcemia improved with fluids.  Elevated blood pressure with no documented history of hypertension.  Patient started on amlodipine 5 mg daily and reduced to 2.5 mg at discharge.  Prerenal azotemia improved with fluids.  Drug-induced platelet defect.  Patient on prior to admit aspirin.  Transition to Facility:              Facility Name: [facility]              Contact name and phone number/fax: [phone]    Plan: RN/SW Care Coordinator will await notification from facility staff informing RN/SW Care Coordinator of patient's discharge plans/needs. RN/SW Care Coordinator will review chart and outreach to facility staff every 4 weeks and as needed.     Shelbi Rai,   [facility]  [phone]

## 2023-07-19 NOTE — LETTER
[facility]       To: TCU SW            Please give to facility      From:   Shelbi LUNA  Care Coordinator   [facility]   P: [phone]  [email or web address]        Patient Name:    Ambar Dutton   YOB: 1932   Admit date:   7-        Information Needed:  Please contact me when the patient will discharge (or if they will move to long term care)- include the discharge date, disposition, and main diagnosis       If the patient is discharged with home care services, please provide the name of the agency    Also- Please inform me if a care conference is being held.     Phone or Email with information                              Thank you

## 2023-07-20 ENCOUNTER — TRANSITIONAL CARE UNIT VISIT (OUTPATIENT)
Dept: GERIATRICS | Facility: CLINIC | Age: 88
End: 2023-07-20
Payer: MEDICARE

## 2023-07-20 VITALS
HEART RATE: 76 BPM | DIASTOLIC BLOOD PRESSURE: 64 MMHG | OXYGEN SATURATION: 91 % | RESPIRATION RATE: 14 BRPM | TEMPERATURE: 96.8 F | SYSTOLIC BLOOD PRESSURE: 159 MMHG | WEIGHT: 113.6 LBS | HEIGHT: 60 IN | BODY MASS INDEX: 22.3 KG/M2

## 2023-07-20 DIAGNOSIS — M80.00XD AGE-RELATED OSTEOPOROSIS WITH CURRENT PATHOLOGICAL FRACTURE WITH ROUTINE HEALING, SUBSEQUENT ENCOUNTER: ICD-10-CM

## 2023-07-20 DIAGNOSIS — S22.060D CLOSED WEDGE COMPRESSION FRACTURE OF T8 VERTEBRA WITH ROUTINE HEALING: Primary | ICD-10-CM

## 2023-07-20 DIAGNOSIS — I10 HYPERTENSION, UNSPECIFIED TYPE: ICD-10-CM

## 2023-07-20 DIAGNOSIS — M48.061 SPINAL STENOSIS OF LUMBAR REGION WITHOUT NEUROGENIC CLAUDICATION: ICD-10-CM

## 2023-07-20 PROCEDURE — 99309 SBSQ NF CARE MODERATE MDM 30: CPT | Performed by: NURSE PRACTITIONER

## 2023-07-20 NOTE — PROGRESS NOTES
[facility]    Code Status:  FULL CODE   Visit Type:   Chief Complaint   Patient presents with     TCU Follow Up     Facility:  [facility] ([facility]) [[id number]]         HPI: Ambar Dutton is a 89 year old female who I am seeing today for follow up on the TCU.  Patient recent hospitalized on 7/13/2023 with weakness and T8 compression fracture.  Past medical history includes acute on chronic back pain, osteoporosis and constipation.  Patient with increasing back pain, diarrhea and poor appetite.  Patient found to have a T8 wedge fracture, T11 compression fracture and T12 burst fracture.  Pathological compression fractures at T7, T12, L2 and L5 with moderate L2-L3 central canal stenosis and mild to moderate L3-L4 stenosis.  An MRI showed subacute compression fracture with up to 50% loss of height with severe burst fracture of T12.  Patient seen by neurosurgery placed in a brace for likely duration of 8 to 12 weeks.  Patient with history of osteoporosis.  Recommendation for outpatient osteoporosis evaluation with possible vertebroplasty pain does not improve.  Hyponatremia improved with fluids.  Urinary tract infection ruled out.  Urine culture less than 10,000 explore.  Mild hypercalcemia improved with fluids.  Elevated blood pressure with no documented history of hypertension.  Patient started on amlodipine 5 mg daily and reduced to 2.5 mg at discharge.  Prerenal azotemia improved with fluids.  Drug-induced platelet defect.  Patient on prior to admit aspirin.    Transitional Care Course: Today patient sitting up in bedside chair. Mx fractures of the thoracic spine. She continues in splinting. Pain controlled with Tylenol, oxycodone and Lidocaine. She is moving quite well.   She denies any dizziness or headache.  No shortness of breath or chest pain.  Her appetite is improving and she is having regular bowel movements.      Assessment/Plan:     1. Closed wedge compression fracture of T8 vertebra  with routine healing  -Multiple pathologic compression fractures at T7, T12, L4-L5  -T12 burst fracture  -T11 compression fracture  -T8 wedge fracture  -Patient continues in sling for duration of 8 to 12 weeks.  -Avoid bending, twisting, lifting to 10 pounds.  -Consider vertebroplasty if pain is not improved.  -Follow-up with neurosurgery in 4 weeks.  -Tylenol, oxycodone and lidocaine patch for pain.    2. Age-related osteoporosis with current pathological fracture with routine healing, subsequent encounter  -Recommended osteoporosis work-up outpatient.    3. Spinal stenosis of lumbar region without neurogenic claudication  -See above.    4. Hypertension, unspecified type  -Patient started on amlodipine 2.5 mg daily during hospitalization.  -Blood pressure appears satisfactory.    5.  Drug-induced platelet defect  -Patient taking aspirin prior to admit.  -No evidence of bleed.  -Plts 286,000.       Active Ambulatory Problems     Diagnosis Date Noted     ACP (advance care planning) 04/15/2014     Anxiety disorder 07/08/2020     Chest pain 04/29/2012     Hypercholesteremia 07/08/2020     Low back pain 03/06/2013     Osteoporosis 07/08/2020     Spinal stenosis of lumbar region 07/08/2020     Stress-induced cardiomyopathy 07/08/2020     Weakness 07/13/2023     Diarrhea 07/20/2022     Closed wedge compression fracture of T8 vertebra with routine healing 07/14/2023     Adult failure to thrive 07/14/2023     Constipation 07/17/2023     Resolved Ambulatory Problems     Diagnosis Date Noted     No Resolved Ambulatory Problems     No Additional Past Medical History     Allergies   Allergen Reactions     Simvastatin Muscle Pain (Myalgia)     Simvastatin and atorvastatin caused arm tingling and possible muscle discomfort.       All Meds and Allergies reviewed in the record at the facility and is the most up-to-date.    Current Outpatient Medications   Medication Sig     acetaminophen (TYLENOL) 325 MG tablet Take 3 tablets (648  mg) by mouth every 8 hours for 10 days     amLODIPine (NORVASC) 5 MG tablet Take 0.5 tablets (2.5 mg) by mouth daily for 30 days     ascorbic acid, vitamin C, (VITAMIN C) 500 MG tablet Take 1,000 mg by mouth daily     aspirin 81 MG EC tablet Take 81 mg by mouth daily     calcium carbonate 500 mg, elemental, 1250 (500 Ca) MG tablet chewable Take 500 mg by mouth as needed     calcium carbonate-vitamin D3 600 mg(1,500mg) -200 unit per tablet Take 1 tablet by mouth daily     calcium polycarbophil (FIBERCON) 625 MG tablet Take 2 tablets by mouth daily     fish oil-omega-3 fatty acids 1000 MG capsule Take 1 g by mouth daily     Lidocaine (LIDOCARE) 4 % Patch Place 1 patch onto the skin every 24 hours for 7 days To prevent lidocaine toxicity, patient should be patch free for 12 hrs daily.     meclizine (ANTIVERT) 25 mg tablet Take 25 mg by mouth daily as needed      methocarbamol (ROBAXIN) 500 MG tablet Take 0.5 tablets (250 mg) by mouth every 6 hours as needed for muscle spasms or other (Pain adjuvant)     methylcellulose (CITRUCEL) 500 MG TABS tablet Take 500 mg by mouth daily     Multiple Vitamins-Minerals (PRESERVISION AREDS 2) CAPS Take 2 capsules by mouth daily     niacin 250 MG tablet [NIACIN 250 MG TABLET] Take 250 mg by mouth daily with breakfast.     oxyCODONE (ROXICODONE) 5 MG tablet Take 0.5 tablets (2.5 mg) by mouth every 6 hours as needed for moderate to severe pain     polyethylene glycol (MIRALAX) 17 gram packet Take 17 g by mouth daily as needed for constipation     senna-docusate (SENOKOT-S/PERICOLACE) 8.6-50 MG tablet Take 1 tablet by mouth daily as needed for constipation     No current facility-administered medications for this visit.       REVIEW OF SYSTEMS:   10 point review of systems reviewed and pertinent positives in the HPI.     PHYSICAL EXAMINATION:  Physical Exam     Vital signs: BP (!) 159/64   Pulse 76   Temp 96.8  F (36  C)   Resp 14   Ht 1.524 m (5')   Wt 51.5 kg (113 lb 9.6 oz)    SpO2 91%   BMI 22.19 kg/m    General: Awake, Alert, oriented x3, sitting up in bedside chair,  conversant  HEENT: Pink conjunctiva,  moist oral mucosa  NECK: Supple  CVS:  S1  S2, without murmur or gallop.   LUNG: Clear to auscultation, No wheezes, rales or rhonci.  BACK: No kyphosis of the thoracic spine.  Splinting in place.  ABDOMEN: Soft, nontender to palpation, with positive bowel sounds  EXTREMITIES: Moves both upper and lower extremities, no pedal edema, no calf tenderness  SKIN: Warm and dry  NEUROLOGIC: Intact, pulses palpable  PSYCHIATRIC: Pleasant mood. Chatty.       Labs:  All labs reviewed in the nursing home record and [employer]   @  Lab Results   Component Value Date    WBC 9.5 07/13/2023     Lab Results   Component Value Date    RBC 4.19 07/13/2023     Lab Results   Component Value Date    HGB 12.9 07/13/2023     Lab Results   Component Value Date    HCT 39.1 07/13/2023     Lab Results   Component Value Date    MCV 93 07/13/2023     Lab Results   Component Value Date    MCH 30.8 07/13/2023     Lab Results   Component Value Date    MCHC 33.0 07/13/2023     Lab Results   Component Value Date    RDW 13.8 07/13/2023     Lab Results   Component Value Date     07/13/2023        @Last Comprehensive Metabolic Panel:  Sodium   Date Value Ref Range Status   07/18/2023 138 136 - 145 mmol/L Final     Potassium   Date Value Ref Range Status   07/18/2023 4.4 3.4 - 5.3 mmol/L Final   01/28/2022 4.4 3.5 - 5.0 mmol/L Final     Chloride   Date Value Ref Range Status   07/18/2023 104 98 - 107 mmol/L Final   01/28/2022 103 98 - 107 mmol/L Final     Carbon Dioxide (CO2)   Date Value Ref Range Status   07/18/2023 24 22 - 29 mmol/L Final   01/28/2022 25 22 - 31 mmol/L Final     Anion Gap   Date Value Ref Range Status   07/18/2023 10 7 - 15 mmol/L Final   01/28/2022 12 5 - 18 mmol/L Final     Glucose   Date Value Ref Range Status   07/18/2023 71 70 - 99 mg/dL Final   01/28/2022 95 70 - 125 mg/dL Final     Urea Nitrogen    Date Value Ref Range Status   07/18/2023 32.6 (H) 8.0 - 23.0 mg/dL Final   01/28/2022 21 8 - 28 mg/dL Final     Creatinine   Date Value Ref Range Status   07/18/2023 0.80 0.51 - 0.95 mg/dL Final     GFR Estimate   Date Value Ref Range Status   07/18/2023 70 >60 mL/min/1.73m2 Final   01/27/2021 56 (L) >60 mL/min/1.73m2 Final     Calcium   Date Value Ref Range Status   07/18/2023 9.2 8.2 - 9.6 mg/dL Final         This note has been dictated using voice recognition software. Any grammatical or context distortions are unintentional and inherent to the software    Electronically signed by: Zakiya Fishman CNP

## 2023-07-20 NOTE — LETTER
7/20/2023        RE: Ambar Dutton  [address]  [address]  [place] [address]        [facility]    Code Status:  FULL CODE   Visit Type:   Chief Complaint   Patient presents with     TCU Follow Up     Facility:  [facility] ([facility]) [[id number]]         HPI: Ambar Dutton is a 89 year old female who I am seeing today for follow up on the TCU.  Patient recent hospitalized on 7/13/2023 with weakness and T8 compression fracture.  Past medical history includes acute on chronic back pain, osteoporosis and constipation.  Patient with increasing back pain, diarrhea and poor appetite.  Patient found to have a T8 wedge fracture, T11 compression fracture and T12 burst fracture.  Pathological compression fractures at T7, T12, L2 and L5 with moderate L2-L3 central canal stenosis and mild to moderate L3-L4 stenosis.  An MRI showed subacute compression fracture with up to 50% loss of height with severe burst fracture of T12.  Patient seen by neurosurgery placed in a brace for likely duration of 8 to 12 weeks.  Patient with history of osteoporosis.  Recommendation for outpatient osteoporosis evaluation with possible vertebroplasty pain does not improve.  Hyponatremia improved with fluids.  Urinary tract infection ruled out.  Urine culture less than 10,000 explore.  Mild hypercalcemia improved with fluids.  Elevated blood pressure with no documented history of hypertension.  Patient started on amlodipine 5 mg daily and reduced to 2.5 mg at discharge.  Prerenal azotemia improved with fluids.  Drug-induced platelet defect.  Patient on prior to admit aspirin.    Transitional Care Course: Today patient sitting up in bedside chair. Mx fractures of the thoracic spine. She continues in splinting. Pain controlled with Tylenol, oxycodone and Lidocaine. She is moving quite well.   She denies any dizziness or headache.  No shortness of breath or chest pain.  Her appetite is improving and she is having  regular bowel movements.      Assessment/Plan:     1. Closed wedge compression fracture of T8 vertebra with routine healing  -Multiple pathologic compression fractures at T7, T12, L4-L5  -T12 burst fracture  -T11 compression fracture  -T8 wedge fracture  -Patient continues in sling for duration of 8 to 12 weeks.  -Avoid bending, twisting, lifting to 10 pounds.  -Consider vertebroplasty if pain is not improved.  -Follow-up with neurosurgery in 4 weeks.  -Tylenol, oxycodone and lidocaine patch for pain.    2. Age-related osteoporosis with current pathological fracture with routine healing, subsequent encounter  -Recommended osteoporosis work-up outpatient.    3. Spinal stenosis of lumbar region without neurogenic claudication  -See above.    4. Hypertension, unspecified type  -Patient started on amlodipine 2.5 mg daily during hospitalization.  -Blood pressure appears satisfactory.    5.  Drug-induced platelet defect  -Patient taking aspirin prior to admit.  -No evidence of bleed.  -Plts 286,000.       Active Ambulatory Problems     Diagnosis Date Noted     ACP (advance care planning) 04/15/2014     Anxiety disorder 07/08/2020     Chest pain 04/29/2012     Hypercholesteremia 07/08/2020     Low back pain 03/06/2013     Osteoporosis 07/08/2020     Spinal stenosis of lumbar region 07/08/2020     Stress-induced cardiomyopathy 07/08/2020     Weakness 07/13/2023     Diarrhea 07/20/2022     Closed wedge compression fracture of T8 vertebra with routine healing 07/14/2023     Adult failure to thrive 07/14/2023     Constipation 07/17/2023     Resolved Ambulatory Problems     Diagnosis Date Noted     No Resolved Ambulatory Problems     No Additional Past Medical History     Allergies   Allergen Reactions     Simvastatin Muscle Pain (Myalgia)     Simvastatin and atorvastatin caused arm tingling and possible muscle discomfort.       All Meds and Allergies reviewed in the record at the facility and is the most up-to-date.    Current  Outpatient Medications   Medication Sig     acetaminophen (TYLENOL) 325 MG tablet Take 3 tablets (975 mg) by mouth every 8 hours for 10 days     amLODIPine (NORVASC) 5 MG tablet Take 0.5 tablets (2.5 mg) by mouth daily for 30 days     ascorbic acid, vitamin C, (VITAMIN C) 500 MG tablet Take 1,000 mg by mouth daily     aspirin 81 MG EC tablet Take 81 mg by mouth daily     calcium carbonate 500 mg, elemental, 1250 (500 Ca) MG tablet chewable Take 500 mg by mouth as needed     calcium carbonate-vitamin D3 600 mg(1,500mg) -200 unit per tablet Take 1 tablet by mouth daily     calcium polycarbophil (FIBERCON) 625 MG tablet Take 2 tablets by mouth daily     fish oil-omega-3 fatty acids 1000 MG capsule Take 1 g by mouth daily     Lidocaine (LIDOCARE) 4 % Patch Place 1 patch onto the skin every 24 hours for 7 days To prevent lidocaine toxicity, patient should be patch free for 12 hrs daily.     meclizine (ANTIVERT) 25 mg tablet Take 25 mg by mouth daily as needed      methocarbamol (ROBAXIN) 500 MG tablet Take 0.5 tablets (250 mg) by mouth every 6 hours as needed for muscle spasms or other (Pain adjuvant)     methylcellulose (CITRUCEL) 500 MG TABS tablet Take 500 mg by mouth daily     Multiple Vitamins-Minerals (PRESERVISION AREDS 2) CAPS Take 2 capsules by mouth daily     niacin 250 MG tablet [NIACIN 250 MG TABLET] Take 250 mg by mouth daily with breakfast.     oxyCODONE (ROXICODONE) 5 MG tablet Take 0.5 tablets (2.5 mg) by mouth every 6 hours as needed for moderate to severe pain     polyethylene glycol (MIRALAX) 17 gram packet Take 17 g by mouth daily as needed for constipation     senna-docusate (SENOKOT-S/PERICOLACE) 8.6-50 MG tablet Take 1 tablet by mouth daily as needed for constipation     No current facility-administered medications for this visit.       REVIEW OF SYSTEMS:   10 point review of systems reviewed and pertinent positives in the HPI.     PHYSICAL EXAMINATION:  Physical Exam     Vital signs: BP (!) 159/64    Pulse 76   Temp 96.8  F (36  C)   Resp 14   Ht 1.524 m (5')   Wt 51.5 kg (113 lb 9.6 oz)   SpO2 91%   BMI 22.19 kg/m    General: Awake, Alert, oriented x3, sitting up in bedside chair,  conversant  HEENT: Pink conjunctiva,  moist oral mucosa  NECK: Supple  CVS:  S1  S2, without murmur or gallop.   LUNG: Clear to auscultation, No wheezes, rales or rhonci.  BACK: No kyphosis of the thoracic spine.  Splinting in place.  ABDOMEN: Soft, nontender to palpation, with positive bowel sounds  EXTREMITIES: Moves both upper and lower extremities, no pedal edema, no calf tenderness  SKIN: Warm and dry  NEUROLOGIC: Intact, pulses palpable  PSYCHIATRIC: Pleasant mood. Chatty.       Labs:  All labs reviewed in the nursing home record and [facility]   @  Lab Results   Component Value Date    WBC 9.5 07/13/2023     Lab Results   Component Value Date    RBC 4.19 07/13/2023     Lab Results   Component Value Date    HGB 12.9 07/13/2023     Lab Results   Component Value Date    HCT 39.1 07/13/2023     Lab Results   Component Value Date    MCV 93 07/13/2023     Lab Results   Component Value Date    MCH 30.8 07/13/2023     Lab Results   Component Value Date    MCHC 33.0 07/13/2023     Lab Results   Component Value Date    RDW 13.8 07/13/2023     Lab Results   Component Value Date     07/13/2023        @Last Comprehensive Metabolic Panel:  Sodium   Date Value Ref Range Status   07/18/2023 138 136 - 145 mmol/L Final     Potassium   Date Value Ref Range Status   07/18/2023 4.4 3.4 - 5.3 mmol/L Final   01/28/2022 4.4 3.5 - 5.0 mmol/L Final     Chloride   Date Value Ref Range Status   07/18/2023 104 98 - 107 mmol/L Final   01/28/2022 103 98 - 107 mmol/L Final     Carbon Dioxide (CO2)   Date Value Ref Range Status   07/18/2023 24 22 - 29 mmol/L Final   01/28/2022 25 22 - 31 mmol/L Final     Anion Gap   Date Value Ref Range Status   07/18/2023 10 7 - 15 mmol/L Final   01/28/2022 12 5 - 18 mmol/L Final     Glucose   Date Value Ref Range  Status   07/18/2023 71 70 - 99 mg/dL Final   01/28/2022 95 70 - 125 mg/dL Final     Urea Nitrogen   Date Value Ref Range Status   07/18/2023 32.6 (H) 8.0 - 23.0 mg/dL Final   01/28/2022 21 8 - 28 mg/dL Final     Creatinine   Date Value Ref Range Status   07/18/2023 0.80 0.51 - 0.95 mg/dL Final     GFR Estimate   Date Value Ref Range Status   07/18/2023 70 >60 mL/min/1.73m2 Final   01/27/2021 56 (L) >60 mL/min/1.73m2 Final     Calcium   Date Value Ref Range Status   07/18/2023 9.2 8.2 - 9.6 mg/dL Final         This note has been dictated using voice recognition software. Any grammatical or context distortions are unintentional and inherent to the software    Electronically signed by: Zakiya Fishman CNP         Sincerely,        Zakiya Fishman, NP

## 2023-07-24 ENCOUNTER — DISCHARGE SUMMARY NURSING HOME (OUTPATIENT)
Dept: GERIATRICS | Facility: CLINIC | Age: 88
End: 2023-07-24
Payer: MEDICARE

## 2023-07-24 VITALS
TEMPERATURE: 97.6 F | HEIGHT: 60 IN | HEART RATE: 66 BPM | RESPIRATION RATE: 16 BRPM | DIASTOLIC BLOOD PRESSURE: 72 MMHG | OXYGEN SATURATION: 94 % | SYSTOLIC BLOOD PRESSURE: 166 MMHG | WEIGHT: 113.6 LBS | BODY MASS INDEX: 22.3 KG/M2

## 2023-07-24 DIAGNOSIS — M48.061 SPINAL STENOSIS OF LUMBAR REGION WITHOUT NEUROGENIC CLAUDICATION: ICD-10-CM

## 2023-07-24 DIAGNOSIS — M80.00XD AGE-RELATED OSTEOPOROSIS WITH CURRENT PATHOLOGICAL FRACTURE WITH ROUTINE HEALING, SUBSEQUENT ENCOUNTER: ICD-10-CM

## 2023-07-24 DIAGNOSIS — S22.060D CLOSED WEDGE COMPRESSION FRACTURE OF T8 VERTEBRA WITH ROUTINE HEALING: Primary | ICD-10-CM

## 2023-07-24 DIAGNOSIS — I10 HYPERTENSION, UNSPECIFIED TYPE: ICD-10-CM

## 2023-07-24 PROCEDURE — 99316 NF DSCHRG MGMT 30 MIN+: CPT | Performed by: NURSE PRACTITIONER

## 2023-07-24 NOTE — LETTER
7/24/2023        RE: Ambar Dutton  [address] [facility]  [address]  [facility] [address]        [facility]    Code Status:  FULL CODE   Visit Type:   Chief Complaint   Patient presents with     TCU Discharge     Facility:  [facility]) [[id number]]         HPI: Ambar Dutton is a 89 year old female who I am seeing today for discharge from the TCU.  Patient recent hospitalized on 7/13/2023 with weakness and T8 compression fracture.  Past medical history includes acute on chronic back pain, osteoporosis and constipation.  Patient with increasing back pain, diarrhea and poor appetite.  Patient found to have a T8 wedge fracture, T11 compression fracture and T12 burst fracture.  Pathological compression fractures at T7, T12, L2 and L5 with moderate L2-L3 central canal stenosis and mild to moderate L3-L4 stenosis.  An MRI showed subacute compression fracture with up to 50% loss of height with severe burst fracture of T12.  Patient seen by neurosurgery placed in a brace for likely duration of 8 to 12 weeks.  Patient with history of osteoporosis.  Recommendation for outpatient osteoporosis evaluation with possible vertebroplasty pain does not improve.  Hyponatremia improved with fluids.  Urinary tract infection ruled out.  Urine culture less than 10,000 explore.  Mild hypercalcemia improved with fluids.  Elevated blood pressure with no documented history of hypertension.  Patient started on amlodipine 5 mg daily and reduced to 2.5 mg at discharge.  Prerenal azotemia improved with fluids.  Drug-induced platelet defect.  Patient on prior to admit aspirin.    Transitional Care Course: Today patient sitting up in bedside chair. Her daughter is present on exam. Mx fractures of the thoracic spine. She continues in splinting. Pain controlled with Tylenol, oxycodone and Lidocaine. She is moving quite well. She denies any dizziness or headache.  No shortness of breath or chest pain. Pt taking  Fibercon before at home. She is off this currently and using prn Senna. Her bowels are moving every 2-3 days. Discussed resuming Fibercon at home.       Assessment/Plan:     1. Closed wedge compression fracture of T8 vertebra with routine healing  -Multiple pathologic compression fractures at T7, T12, L4-L5  -T12 burst fracture  -T11 compression fracture  -T8 wedge fracture  -Patient continues in sling for duration of 8 to 12 weeks.  -Avoid bending, twisting, lifting to 10 pounds.  -Consider vertebroplasty if pain is not improved.  -Follow-up with neurosurgery in 3 weeks.  -Tylenol, oxycodone and lidocaine patch for pain.    2. Age-related osteoporosis with current pathological fracture with routine healing, subsequent encounter  -Recommended osteoporosis work-up outpatient.    3. Spinal stenosis of lumbar region without neurogenic claudication  -See above.    4. Hypertension, unspecified type  -Patient started on amlodipine 2.5 mg daily during hospitalization.  -Blood pressure appears satisfactory.    5.  Drug-induced platelet defect  -Patient taking aspirin prior to admit.  -No evidence of bleed.  -Plts 286,000.     6. Constipation  -Resume Fibercon at home.     Ok to discharge to [facility] with current meds and treatments. Home PT, OT, HHA and RN for medication management. Follow up with PCP in 1-2 weeks. Follow up out pt with Spine/Ortho.     Active Ambulatory Problems     Diagnosis Date Noted     ACP (advance care planning) 04/15/2014     Anxiety disorder 07/08/2020     Chest pain 04/29/2012     Hypercholesteremia 07/08/2020     Low back pain 03/06/2013     Osteoporosis 07/08/2020     Spinal stenosis of lumbar region 07/08/2020     Stress-induced cardiomyopathy 07/08/2020     Weakness 07/13/2023     Diarrhea 07/20/2022     Closed wedge compression fracture of T8 vertebra with routine healing 07/14/2023     Adult failure to thrive 07/14/2023     Constipation 07/17/2023     Resolved Ambulatory Problems     Diagnosis  Date Noted     No Resolved Ambulatory Problems     No Additional Past Medical History     Allergies   Allergen Reactions     Simvastatin Muscle Pain (Myalgia)     Simvastatin and atorvastatin caused arm tingling and possible muscle discomfort.       All Meds and Allergies reviewed in the record at the facility and is the most up-to-date.    Current Outpatient Medications   Medication Sig     acetaminophen (TYLENOL) 325 MG tablet Take 3 tablets (975 mg) by mouth every 8 hours for 10 days     amLODIPine (NORVASC) 5 MG tablet Take 0.5 tablets (2.5 mg) by mouth daily for 30 days     ascorbic acid, vitamin C, (VITAMIN C) 500 MG tablet Take 1,000 mg by mouth daily     aspirin 81 MG EC tablet Take 81 mg by mouth daily     calcium carbonate 500 mg, elemental, 1250 (500 Ca) MG tablet chewable Take 500 mg by mouth as needed     calcium carbonate-vitamin D3 600 mg(1,500mg) -200 unit per tablet Take 1 tablet by mouth daily     calcium polycarbophil (FIBERCON) 625 MG tablet Take 2 tablets by mouth daily     fish oil-omega-3 fatty acids 1000 MG capsule Take 1 g by mouth daily     meclizine (ANTIVERT) 25 mg tablet Take 25 mg by mouth daily as needed      methocarbamol (ROBAXIN) 500 MG tablet Take 0.5 tablets (250 mg) by mouth every 6 hours as needed for muscle spasms or other (Pain adjuvant)     methylcellulose (CITRUCEL) 500 MG TABS tablet Take 500 mg by mouth daily     Multiple Vitamins-Minerals (PRESERVISION AREDS 2) CAPS Take 2 capsules by mouth daily     niacin 250 MG tablet [NIACIN 250 MG TABLET] Take 250 mg by mouth daily with breakfast.     oxyCODONE (ROXICODONE) 5 MG tablet Take 0.5 tablets (2.5 mg) by mouth every 6 hours as needed for moderate to severe pain     polyethylene glycol (MIRALAX) 17 gram packet Take 17 g by mouth daily as needed for constipation     senna-docusate (SENOKOT-S/PERICOLACE) 8.6-50 MG tablet Take 1 tablet by mouth daily as needed for constipation     No current facility-administered medications  for this visit.       REVIEW OF SYSTEMS:   10 point review of systems reviewed and pertinent positives in the HPI.     PHYSICAL EXAMINATION:  Physical Exam     Vital signs: BP (!) 166/72   Pulse 66   Temp 97.6  F (36.4  C)   Resp 16   Ht 1.524 m (5')   Wt 51.5 kg (113 lb 9.6 oz)   SpO2 94%   BMI 22.19 kg/m    General: Awake, Alert, oriented x3, sitting up in bedside chair,  conversant  HEENT: Pink conjunctiva,  moist oral mucosa  NECK: Supple  CVS:  S1  S2, without murmur or gallop.   LUNG: Clear to auscultation, No wheezes, rales or rhonci.  BACK: No kyphosis of the thoracic spine.  Splinting in place.  ABDOMEN: Soft, nontender to palpation, with positive bowel sounds  EXTREMITIES: Moves both upper and lower extremities, no pedal edema, no calf tenderness  SKIN: Warm and dry  NEUROLOGIC: Intact, pulses palpable  PSYCHIATRIC: Pleasant mood.      Labs:  All labs reviewed in the nursing home record and Epic   @  Lab Results   Component Value Date    WBC 9.5 07/13/2023     Lab Results   Component Value Date    RBC 4.19 07/13/2023     Lab Results   Component Value Date    HGB 12.9 07/13/2023     Lab Results   Component Value Date    HCT 39.1 07/13/2023     Lab Results   Component Value Date    MCV 93 07/13/2023     Lab Results   Component Value Date    MCH 30.8 07/13/2023     Lab Results   Component Value Date    MCHC 33.0 07/13/2023     Lab Results   Component Value Date    RDW 13.8 07/13/2023     Lab Results   Component Value Date     07/13/2023        @Last Comprehensive Metabolic Panel:  Sodium   Date Value Ref Range Status   07/18/2023 138 136 - 145 mmol/L Final     Potassium   Date Value Ref Range Status   07/18/2023 4.4 3.4 - 5.3 mmol/L Final   01/28/2022 4.4 3.5 - 5.0 mmol/L Final     Chloride   Date Value Ref Range Status   07/18/2023 104 98 - 107 mmol/L Final   01/28/2022 103 98 - 107 mmol/L Final     Carbon Dioxide (CO2)   Date Value Ref Range Status   07/18/2023 24 22 - 29 mmol/L Final    01/28/2022 25 22 - 31 mmol/L Final     Anion Gap   Date Value Ref Range Status   07/18/2023 10 7 - 15 mmol/L Final   01/28/2022 12 5 - 18 mmol/L Final     Glucose   Date Value Ref Range Status   07/18/2023 71 70 - 99 mg/dL Final   01/28/2022 95 70 - 125 mg/dL Final     Urea Nitrogen   Date Value Ref Range Status   07/18/2023 32.6 (H) 8.0 - 23.0 mg/dL Final   01/28/2022 21 8 - 28 mg/dL Final     Creatinine   Date Value Ref Range Status   07/18/2023 0.80 0.51 - 0.95 mg/dL Final     GFR Estimate   Date Value Ref Range Status   07/18/2023 70 >60 mL/min/1.73m2 Final   01/27/2021 56 (L) >60 mL/min/1.73m2 Final     Calcium   Date Value Ref Range Status   07/18/2023 9.2 8.2 - 9.6 mg/dL Final     DISCHARGE PLAN/FACE TO FACE:  I certify that this patient is under my care and that I, or a nurse practitioner or physician's assistant working with me, had a face-to-face encounter that meets the physician face-to-face encounter requirements with this patient.       I certify that, based on my findings, the following services are medically necessary home health services.    My clinical findings support the need for the above skilled services.    This patient is homebound because: Pt with mx pathological fractures of the thoracic and lumbar spine.     The patient is, or has been, under my care and I have initiated the establishment of the plan of care. This patient will be followed by a physician who will periodically review the plan of care.    35 minutes spent reviewing discharge medications, home care services and follow ups.       This note has been dictated using voice recognition software. Any grammatical or context distortions are unintentional and inherent to the software    Electronically signed by: Zakiya Fishman CNP       Sincerely,        Zakiya Fishman NP

## 2023-07-25 NOTE — PROGRESS NOTES
[facility] GERIATRIC SERVICES    Code Status:  FULL CODE   Visit Type:   Chief Complaint   Patient presents with    TCU Discharge     Facility:  [facility]) [[id number]]         HPI: Ambar Dutton is a 89 year old female who I am seeing today for discharge from the TCU.  Patient recent hospitalized on 7/13/2023 with weakness and T8 compression fracture.  Past medical history includes acute on chronic back pain, osteoporosis and constipation.  Patient with increasing back pain, diarrhea and poor appetite.  Patient found to have a T8 wedge fracture, T11 compression fracture and T12 burst fracture.  Pathological compression fractures at T7, T12, L2 and L5 with moderate L2-L3 central canal stenosis and mild to moderate L3-L4 stenosis.  An MRI showed subacute compression fracture with up to 50% loss of height with severe burst fracture of T12.  Patient seen by neurosurgery placed in a brace for likely duration of 8 to 12 weeks.  Patient with history of osteoporosis.  Recommendation for outpatient osteoporosis evaluation with possible vertebroplasty pain does not improve.  Hyponatremia improved with fluids.  Urinary tract infection ruled out.  Urine culture less than 10,000 explore.  Mild hypercalcemia improved with fluids.  Elevated blood pressure with no documented history of hypertension.  Patient started on amlodipine 5 mg daily and reduced to 2.5 mg at discharge.  Prerenal azotemia improved with fluids.  Drug-induced platelet defect.  Patient on prior to admit aspirin.    Transitional Care Course: Today patient sitting up in bedside chair. Her daughter is present on exam. Mx fractures of the thoracic spine. She continues in splinting. Pain controlled with Tylenol, oxycodone and Lidocaine. She is moving quite well. She denies any dizziness or headache.  No shortness of breath or chest pain. Pt taking Fibercon before at home. She is off this currently and using prn Senna. Her bowels are moving every 2-3  days. Discussed resuming Fibercon at home.       Assessment/Plan:     1. Closed wedge compression fracture of T8 vertebra with routine healing  -Multiple pathologic compression fractures at T7, T12, L4-L5  -T12 burst fracture  -T11 compression fracture  -T8 wedge fracture  -Patient continues in sling for duration of 8 to 12 weeks.  -Avoid bending, twisting, lifting to 10 pounds.  -Consider vertebroplasty if pain is not improved.  -Follow-up with neurosurgery in 3 weeks.  -Tylenol, oxycodone and lidocaine patch for pain.    2. Age-related osteoporosis with current pathological fracture with routine healing, subsequent encounter  -Recommended osteoporosis work-up outpatient.    3. Spinal stenosis of lumbar region without neurogenic claudication  -See above.    4. Hypertension, unspecified type  -Patient started on amlodipine 2.5 mg daily during hospitalization.  -Blood pressure appears satisfactory.    5.  Drug-induced platelet defect  -Patient taking aspirin prior to admit.  -No evidence of bleed.  -Plts 286,000.     6. Constipation  -Resume Fibercon at home.     Ok to discharge to [facility] with current meds and treatments. Home PT, OT, HHA and RN for medication management. Follow up with PCP in 1-2 weeks. Follow up out pt with Spine/Ortho.     Active Ambulatory Problems     Diagnosis Date Noted    ACP (advance care planning) 04/15/2014    Anxiety disorder 07/08/2020    Chest pain 04/29/2012    Hypercholesteremia 07/08/2020    Low back pain 03/06/2013    Osteoporosis 07/08/2020    Spinal stenosis of lumbar region 07/08/2020    Stress-induced cardiomyopathy 07/08/2020    Weakness 07/13/2023    Diarrhea 07/20/2022    Closed wedge compression fracture of T8 vertebra with routine healing 07/14/2023    Adult failure to thrive 07/14/2023    Constipation 07/17/2023     Resolved Ambulatory Problems     Diagnosis Date Noted    No Resolved Ambulatory Problems     No Additional Past Medical History     Allergies   Allergen Reactions     Simvastatin Muscle Pain (Myalgia)     Simvastatin and atorvastatin caused arm tingling and possible muscle discomfort.       All Meds and Allergies reviewed in the record at the facility and is the most up-to-date.    Current Outpatient Medications   Medication Sig    acetaminophen (TYLENOL) 325 MG tablet Take 3 tablets (975 mg) by mouth every 8 hours for 10 days    amLODIPine (NORVASC) 5 MG tablet Take 0.5 tablets (2.5 mg) by mouth daily for 30 days    ascorbic acid, vitamin C, (VITAMIN C) 500 MG tablet Take 1,000 mg by mouth daily    aspirin 81 MG EC tablet Take 81 mg by mouth daily    calcium carbonate 500 mg, elemental, 1250 (500 Ca) MG tablet chewable Take 500 mg by mouth as needed    calcium carbonate-vitamin D3 600 mg(1,500mg) -200 unit per tablet Take 1 tablet by mouth daily    calcium polycarbophil (FIBERCON) 625 MG tablet Take 2 tablets by mouth daily    fish oil-omega-3 fatty acids 1000 MG capsule Take 1 g by mouth daily    meclizine (ANTIVERT) 25 mg tablet Take 25 mg by mouth daily as needed     methocarbamol (ROBAXIN) 500 MG tablet Take 0.5 tablets (250 mg) by mouth every 6 hours as needed for muscle spasms or other (Pain adjuvant)    methylcellulose (CITRUCEL) 500 MG TABS tablet Take 500 mg by mouth daily    Multiple Vitamins-Minerals (PRESERVISION AREDS 2) CAPS Take 2 capsules by mouth daily    niacin 250 MG tablet [NIACIN 250 MG TABLET] Take 250 mg by mouth daily with breakfast.    oxyCODONE (ROXICODONE) 5 MG tablet Take 0.5 tablets (2.5 mg) by mouth every 6 hours as needed for moderate to severe pain    polyethylene glycol (MIRALAX) 17 gram packet Take 17 g by mouth daily as needed for constipation    senna-docusate (SENOKOT-S/PERICOLACE) 8.6-50 MG tablet Take 1 tablet by mouth daily as needed for constipation     No current facility-administered medications for this visit.       REVIEW OF SYSTEMS:   10 point review of systems reviewed and pertinent positives in the HPI.     PHYSICAL  EXAMINATION:  Physical Exam     Vital signs: BP (!) 166/72   Pulse 66   Temp 97.6  F (36.4  C)   Resp 16   Ht 1.524 m (5')   Wt 51.5 kg (113 lb 9.6 oz)   SpO2 94%   BMI 22.19 kg/m    General: Awake, Alert, oriented x3, sitting up in bedside chair,  conversant  HEENT: Pink conjunctiva,  moist oral mucosa  NECK: Supple  CVS:  S1  S2, without murmur or gallop.   LUNG: Clear to auscultation, No wheezes, rales or rhonci.  BACK: No kyphosis of the thoracic spine.  Splinting in place.  ABDOMEN: Soft, nontender to palpation, with positive bowel sounds  EXTREMITIES: Moves both upper and lower extremities, no pedal edema, no calf tenderness  SKIN: Warm and dry  NEUROLOGIC: Intact, pulses palpable  PSYCHIATRIC: Pleasant mood.      Labs:  All labs reviewed in the nursing home record and [employer]   @  Lab Results   Component Value Date    WBC 9.5 07/13/2023     Lab Results   Component Value Date    RBC 4.19 07/13/2023     Lab Results   Component Value Date    HGB 12.9 07/13/2023     Lab Results   Component Value Date    HCT 39.1 07/13/2023     Lab Results   Component Value Date    MCV 93 07/13/2023     Lab Results   Component Value Date    MCH 30.8 07/13/2023     Lab Results   Component Value Date    MCHC 33.0 07/13/2023     Lab Results   Component Value Date    RDW 13.8 07/13/2023     Lab Results   Component Value Date     07/13/2023        @Last Comprehensive Metabolic Panel:  Sodium   Date Value Ref Range Status   07/18/2023 138 136 - 145 mmol/L Final     Potassium   Date Value Ref Range Status   07/18/2023 4.4 3.4 - 5.3 mmol/L Final   01/28/2022 4.4 3.5 - 5.0 mmol/L Final     Chloride   Date Value Ref Range Status   07/18/2023 104 98 - 107 mmol/L Final   01/28/2022 103 98 - 107 mmol/L Final     Carbon Dioxide (CO2)   Date Value Ref Range Status   07/18/2023 24 22 - 29 mmol/L Final   01/28/2022 25 22 - 31 mmol/L Final     Anion Gap   Date Value Ref Range Status   07/18/2023 10 7 - 15 mmol/L Final   01/28/2022 12 5 -  18 mmol/L Final     Glucose   Date Value Ref Range Status   07/18/2023 71 70 - 99 mg/dL Final   01/28/2022 95 70 - 125 mg/dL Final     Urea Nitrogen   Date Value Ref Range Status   07/18/2023 32.6 (H) 8.0 - 23.0 mg/dL Final   01/28/2022 21 8 - 28 mg/dL Final     Creatinine   Date Value Ref Range Status   07/18/2023 0.80 0.51 - 0.95 mg/dL Final     GFR Estimate   Date Value Ref Range Status   07/18/2023 70 >60 mL/min/1.73m2 Final   01/27/2021 56 (L) >60 mL/min/1.73m2 Final     Calcium   Date Value Ref Range Status   07/18/2023 9.2 8.2 - 9.6 mg/dL Final     DISCHARGE PLAN/FACE TO FACE:  I certify that this patient is under my care and that I, or a nurse practitioner or physician's assistant working with me, had a face-to-face encounter that meets the physician face-to-face encounter requirements with this patient.       I certify that, based on my findings, the following services are medically necessary home health services.    My clinical findings support the need for the above skilled services.    This patient is homebound because: Pt with mx pathological fractures of the thoracic and lumbar spine.     The patient is, or has been, under my care and I have initiated the establishment of the plan of care. This patient will be followed by a physician who will periodically review the plan of care.    35 minutes spent reviewing discharge medications, home care services and follow ups.       This note has been dictated using voice recognition software. Any grammatical or context distortions are unintentional and inherent to the software    Electronically signed by: Zakiya Fishman CNP

## 2023-07-27 ENCOUNTER — TELEPHONE (OUTPATIENT)
Dept: INTERNAL MEDICINE | Facility: CLINIC | Age: 88
End: 2023-07-27
Payer: MEDICARE

## 2023-07-27 NOTE — TELEPHONE ENCOUNTER
Home Health Care    Reason for call:  Verbal Orders    Orders are needed for this patient.  Evaluation and Treat starting tomorrow 07/28/2023 for Home Health Aide, Skilled Nursing, PT and OT requesting call back today    Pt Provider: Mary Barnes NP    Phone Number Homecare Nurse can be reached at: [phone]    Can we leave a detailed message on this number? YES

## 2023-07-28 ENCOUNTER — TELEPHONE (OUTPATIENT)
Dept: INTERNAL MEDICINE | Facility: CLINIC | Age: 88
End: 2023-07-28
Payer: MEDICARE

## 2023-07-28 DIAGNOSIS — I10 BENIGN ESSENTIAL HYPERTENSION: ICD-10-CM

## 2023-07-28 NOTE — TELEPHONE ENCOUNTER
Home Health Care    Reason for call:  Verbal Orders, Update and Medication Request    Orders are needed for this patient.  Skilled Nursin visit per week for three weeks  requesting 2 PRN's  PT: Eval and Treat  OT: Eval and Treat  Home Health Aide 1 visit per week as long as patient is receiving therapies with [facility]    Patient was discharged without Amlodipine.  While at TCU receiving 2.5 mg Amlodipine in AM. Family prefers to use [facility] in [facility] reports patient has been without Amlodipine since 23    Today's /72     Pt Provider: Mary Barnes NP    Phone Number Homecare Nurse can be reached at: [phone]    Can we leave a detailed message on this number? YES

## 2023-07-31 ENCOUNTER — PATIENT OUTREACH (OUTPATIENT)
Dept: CARE COORDINATION | Facility: CLINIC | Age: 88
End: 2023-07-31
Payer: MEDICARE

## 2023-07-31 NOTE — PROGRESS NOTES
Contact   Chart Review     Situation: Patient chart reviewed by .    Background: following for discharge from TCU.    Assessment: talked to daughter on consent to communicate.  Patient has moved into [facility] in [place] and will be using [facility] physicians for her primary care. She loved her PCP at [facility], but realizes it will be easier to use onsite PCP.    Patient is doing really well at [facility] and is participating in activities and is thriving!    Plan/Recommendations: No further outreach. Notifying PCP.    Shelbi Rai,   [facility]  [phone]

## 2023-07-31 NOTE — Clinical Note
Daughter wanted to say thank you for all your care. Her mom loves you. She is switching to use in house PCP through [facility] at her new assisted living facility. Thanks, Shelbi

## 2023-08-02 ENCOUNTER — MEDICAL CORRESPONDENCE (OUTPATIENT)
Dept: HEALTH INFORMATION MANAGEMENT | Facility: CLINIC | Age: 88
End: 2023-08-02
Payer: MEDICARE

## 2023-08-02 RX ORDER — AMLODIPINE BESYLATE 5 MG/1
2.5 TABLET ORAL DAILY
Qty: 15 TABLET | Refills: 0 | Status: SHIPPED | OUTPATIENT
Start: 2023-08-02 | End: 2023-08-07

## 2023-08-02 NOTE — TELEPHONE ENCOUNTER
Please see regarding-    Patient was discharged without Amlodipine.  While at TCU receiving 2.5 mg Amlodipine in AM. Family prefers to use [facility] in [place]  Sri reports patient has been without Amlodipine since Wednesday 07/26/23     Today's /72     Voicemail left for Sri with ok for requested verbal orders below.

## 2023-08-07 ENCOUNTER — HOSPITAL ENCOUNTER (OUTPATIENT)
Dept: GENERAL RADIOLOGY | Facility: HOSPITAL | Age: 88
Discharge: HOME | End: 2023-08-07
Attending: NURSE PRACTITIONER | Admitting: NURSE PRACTITIONER
Payer: MEDICARE

## 2023-08-07 DIAGNOSIS — S22.080A CLOSED WEDGE COMPRESSION FRACTURE OF T11 VERTEBRA, INITIAL ENCOUNTER (H): ICD-10-CM

## 2023-08-07 DIAGNOSIS — M81.0 AGE-RELATED OSTEOPOROSIS WITHOUT CURRENT PATHOLOGICAL FRACTURE: ICD-10-CM

## 2023-08-07 PROCEDURE — 72070 X-RAY EXAM THORAC SPINE 2VWS: CPT

## 2023-08-11 ENCOUNTER — VIRTUAL VISIT (OUTPATIENT)
Dept: NEUROSURGERY | Facility: CLINIC | Age: 88
End: 2023-08-11
Payer: MEDICARE

## 2023-08-11 DIAGNOSIS — S22.080D COMPRESSION FRACTURE OF T11 VERTEBRA WITH ROUTINE HEALING, SUBSEQUENT ENCOUNTER: Primary | ICD-10-CM

## 2023-08-11 PROCEDURE — 99442 PR PHYSICIAN TELEPHONE EVALUATION 11-20 MIN: CPT | Mod: 95 | Performed by: PHYSICIAN ASSISTANT

## 2023-08-11 NOTE — PATIENT INSTRUCTIONS
Has been advised to continue to wear her TLSO brace as she is currently wearing. Will plan to follow up in 4 weeks with repeat thoracic xray at that time. She understands that should any symptoms worsen or arise to contact the office sooner.

## 2023-08-11 NOTE — PROGRESS NOTES
"Ambar is a 89 year old who is being evaluated via a billable telephone visit.      What phone number would you like to be contacted at? [phone]  How would you like to obtain your AVS? Lynnette  Distant Location (provider location):  On-site  Distant Location (patient location): at home       Amy Villalta is a 89 year old, presenting via telephone appointment for follow up of her T11 fracture noted after a fall 7/14/2023. She states that overall she is doing well. She denies any current complaints of back pain or radicular lower extremity pain numbness tingling or weakness. She denies changes in bowel or bladder habits.     HPI 89yo patient hx severe osteoporosis (last dexa 2020) who presented to [facility] ED on 7/13/23 for evaluation of diarrhea and back pain. Pain states she woke up two weeks ago with \"pain all over\" her anterior and posterior chest and back. She denies any injury, trauma. Felt like she had been \"hit by a truck\". The pain is worse with lifting, bending, and transitions from sitting to standing or sitting to lying down. She tried to wait it out. Was still able to get around with use of her walker at her independent living, but the pain did not improve. She wasn't sure what to try. She then started having diarrhea yesterday and was brought in for further evaluation of both concerns last night. Since being in the hospital, she has taken tylenol and used a lidocaine patch with some relief. She denies any neck, arm, or leg pain, numbness, tingling, weakness, change in bowel or bladder control.   She takes calcium/vit D but no osteoporosis agents    Review of Systems   Constitutional, HEENT, cardiovascular, pulmonary, gi and gu systems are negative, except as otherwise noted.      Objective       Vitals:  No vitals were obtained today due to virtual visit.    Physical Exam   healthy, alert, and no distress  PSYCH: Alert and oriented times 3; coherent speech, normal   rate and volume, able to " articulate logical thoughts, able   to abstract reason, no tangential thoughts, no hallucinations   or delusions  Her affect is normal  RESP: No cough, no audible wheezing, able to talk in full sentences  Remainder of exam unable to be completed due to telephone visits    Xray reviewed personally   IMPRESSION: There is progressive T11 vertebral body height loss, currently measuring 70%. Chronic compression fractures of the T12, T8, T7 vertebral bodies with unchanged height loss. Additional chronic compression fractures of the L2, L3, and L4   vertebral bodies with unchanged height loss. Exaggerated thoracic kyphosis measures 75 degrees. Atherosclerotic vascular calcifications in the abdomen. The visualized portions of the lungs are clear.     Plan: Has been advised to continue to wear her TLSO brace as she is currently wearing. Will plan to follow up in 4 weeks with repeat thoracic xray at that time. She understands that should any symptoms worsen or arise to contact the office sooner.     Phone call duration: 15 minutes

## 2023-08-11 NOTE — LETTER
"    8/11/2023         RE: Ambar Dutton  [address]  [place] [address]        Dear Colleague,    Thank you for referring your patient, Ambar Dutton, to the [facility]. Please see a copy of my visit note below.    Ambar is a 89 year old who is being evaluated via a billable telephone visit.      What phone number would you like to be contacted at? [phone]  How would you like to obtain your AVS? MyChart  Distant Location (provider location):  On-site  Distant Location (patient location): at home       Subjective  Ambar is a 89 year old, presenting via telephone appointment for follow up of her T11 fracture noted after a fall 7/14/2023. She states that overall she is doing well. She denies any current complaints of back pain or radicular lower extremity pain numbness tingling or weakness. She denies changes in bowel or bladder habits.     HPI 89yo patient hx severe osteoporosis (last dexa 2020) who presented to [facility] ED on 7/13/23 for evaluation of diarrhea and back pain. Pain states she woke up two weeks ago with \"pain all over\" her anterior and posterior chest and back. She denies any injury, trauma. Felt like she had been \"hit by a truck\". The pain is worse with lifting, bending, and transitions from sitting to standing or sitting to lying down. She tried to wait it out. Was still able to get around with use of her walker at her independent living, but the pain did not improve. She wasn't sure what to try. She then started having diarrhea yesterday and was brought in for further evaluation of both concerns last night. Since being in the hospital, she has taken tylenol and used a lidocaine patch with some relief. She denies any neck, arm, or leg pain, numbness, tingling, weakness, change in bowel or bladder control.   She takes calcium/vit D but no osteoporosis agents    Review of Systems   Constitutional, HEENT, cardiovascular, pulmonary, gi and gu " systems are negative, except as otherwise noted.      Objective      Vitals:  No vitals were obtained today due to virtual visit.    Physical Exam   healthy, alert, and no distress  PSYCH: Alert and oriented times 3; coherent speech, normal   rate and volume, able to articulate logical thoughts, able   to abstract reason, no tangential thoughts, no hallucinations   or delusions  Her affect is normal  RESP: No cough, no audible wheezing, able to talk in full sentences  Remainder of exam unable to be completed due to telephone visits    Xray reviewed personally   IMPRESSION: There is progressive T11 vertebral body height loss, currently measuring 70%. Chronic compression fractures of the T12, T8, T7 vertebral bodies with unchanged height loss. Additional chronic compression fractures of the L2, L3, and L4   vertebral bodies with unchanged height loss. Exaggerated thoracic kyphosis measures 75 degrees. Atherosclerotic vascular calcifications in the abdomen. The visualized portions of the lungs are clear.     Plan: Has been advised to continue to wear her TLSO brace as she is currently wearing. Will plan to follow up in 4 weeks with repeat thoracic xray at that time. She understands that should any symptoms worsen or arise to contact the office sooner.     Phone call duration: 15 minutes         Again, thank you for allowing me to participate in the care of your patient.        Sincerely,        Savi Yost PA-C

## 2023-09-08 ENCOUNTER — HOSPITAL ENCOUNTER (OUTPATIENT)
Dept: GENERAL RADIOLOGY | Facility: HOSPITAL | Age: 88
Discharge: HOME | End: 2023-09-08
Attending: PHYSICIAN ASSISTANT | Admitting: PHYSICIAN ASSISTANT
Payer: MEDICARE

## 2023-09-08 DIAGNOSIS — S22.080D COMPRESSION FRACTURE OF T11 VERTEBRA WITH ROUTINE HEALING, SUBSEQUENT ENCOUNTER: ICD-10-CM

## 2023-09-08 PROCEDURE — 72070 X-RAY EXAM THORAC SPINE 2VWS: CPT

## 2023-09-14 ENCOUNTER — VIRTUAL VISIT (OUTPATIENT)
Dept: NEUROSURGERY | Facility: CLINIC | Age: 88
End: 2023-09-14
Payer: MEDICARE

## 2023-09-14 DIAGNOSIS — S22.080D COMPRESSION FRACTURE OF T11 VERTEBRA WITH ROUTINE HEALING, SUBSEQUENT ENCOUNTER: Primary | ICD-10-CM

## 2023-09-14 PROCEDURE — 99441 PR PHYSICIAN TELEPHONE EVALUATION 5-10 MIN: CPT | Mod: 95 | Performed by: PHYSICIAN ASSISTANT

## 2023-09-14 RX ORDER — ACETAMINOPHEN 80 MG/1
80 TABLET, CHEWABLE ORAL EVERY 4 HOURS PRN
COMMUNITY

## 2023-09-14 NOTE — PROGRESS NOTES
"Ambar is a 89 year old who is being evaluated via a billable telephone visit.      What phone number would you like to be contacted at? [phone]  How would you like to obtain your AVS? Lynnette  Distant Location (provider location):  On-site    A/P: T11 fracture noted after fall on 7/14/2023    Plan: Patient has been advised to  wean from brace by removing the brace for 1-2 hours a day, increasing each day by 1-2 hour increments.  If at any time during the wean you experience excessive fatigue, back pain or spasm, back down to the previous level for a day or so, then resume schedule.  Once out of brace for a full 8 hours, discontinue altogether or wear only as needed for comfort. Advised to gradually get back into her normal activities. Follow up will be on an as needed basis and she understands to contact the office should any symptoms arise.       Subjective   Ambar is a 89 year old, presenting via telephone appointment for follow up of her T11 fracture noted after a fall 7/14/2023. She states  that overall she is doing well. She denies any current complaints of back pain or radicular lower extremity pain numbness tingling or weakness. She denies changes in bowel or bladder habits. She has been able to cut back on her tylenol usage and continues to do so.     HPI     89yo patient hx severe osteoporosis (last dexa 2020) who presented to [facility] ED on 7/13/23 for evaluation of diarrhea and back pain. Pain states she woke up two weeks ago with \"pain all over\" her anterior and posterior chest and back. She denies any injury, trauma. Felt like she had been \"hit by a truck\". The pain is worse with lifting, bending, and transitions from sitting to standing or sitting to lying down. She tried to wait it out. Was still able to get around with use of her walker at her independent living, but the pain did not improve. She wasn't sure what to try. She then started having diarrhea yesterday and was brought in for " further evaluation of both concerns last night. Since being in the hospital, she has taken tylenol and used a lidocaine patch with some relief. She denies any neck, arm, or leg pain, numbness, tingling, weakness, change in bowel or bladder control.   She takes calcium/vit D but no osteoporosis agents    Review of Systems   Constitutional, HEENT, cardiovascular, pulmonary, gi and gu systems are negative, except as otherwise noted.      Objective       Vitals:  No vitals were obtained today due to virtual visit.    Physical Exam   healthy, alert, and no distress  PSYCH: Alert and oriented times 3; coherent speech, normal   rate and volume, able to articulate logical thoughts, able   to abstract reason, no tangential thoughts, no hallucinations   or delusions  Her affect is normal  RESP: No cough, no audible wheezing, able to talk in full sentences  Remainder of exam unable to be completed due to telephone visits    Xray reviewed personally   IMPRESSION: Redemonstrated exaggerated kyphosis surrounding the chronic T7, T8, T11, T12, L2, and L3 vertebral body compression deformities. No acute fracture. Scattered multilevel degenerative change.         Phone call duration: 10 minutes

## 2023-09-14 NOTE — PATIENT INSTRUCTIONS
Patient has been advised to  wean from brace by removing the brace for 1-2 hours a day, increasing each day by 1-2 hour increments.  If at any time during the wean you experience excessive fatigue, back pain or spasm, back down to the previous level for a day or so, then resume schedule.  Once out of brace for a full 8 hours, discontinue altogether or wear only as needed for comfort. Advised to gradually get back into her normal activities. Follow up will be on an as needed basis and she understands to contact the office should any symptoms arise.

## 2023-09-14 NOTE — LETTER
"    9/14/2023         RE: Ambar Dutton  [address]  [place] [address]        Dear Colleague,    Thank you for referring your patient, Ambar Dutton, to the [facility]UROS[facility]ERY. Please see a copy of my visit note below.    Ambar is a 89 year old who is being evaluated via a billable telephone visit.      What phone number would you like to be contacted at? [phone]  How would you like to obtain your AVS? Sethhart  Distant Location (provider location):  On-site    A/P: T11 fracture noted after fall on 7/14/2023    Plan: Patient has been advised to  wean from brace by removing the brace for 1-2 hours a day, increasing each day by 1-2 hour increments.  If at any time during the wean you experience excessive fatigue, back pain or spasm, back down to the previous level for a day or so, then resume schedule.  Once out of brace for a full 8 hours, discontinue altogether or wear only as needed for comfort. Advised to gradually get back into her normal activities. Follow up will be on an as needed basis and she understands to contact the office should any symptoms arise.       Subjective  Ambar is a 89 year old, presenting via telephone appointment for follow up of her T11 fracture noted after a fall 7/14/2023. She states  that overall she is doing well. She denies any current complaints of back pain or radicular lower extremity pain numbness tingling or weakness. She denies changes in bowel or bladder habits. She has been able to cut back on her tylenol usage and continues to do so.     HPI     89yo patient hx severe osteoporosis (last dexa 2020) who presented to [facility] ED on 7/13/23 for evaluation of diarrhea and back pain. Pain states she woke up two weeks ago with \"pain all over\" her anterior and posterior chest and back. She denies any injury, trauma. Felt like she had been \"hit by a truck\". The pain is worse with lifting, bending, and transitions from sitting to " standing or sitting to lying down. She tried to wait it out. Was still able to get around with use of her walker at her independent living, but the pain did not improve. She wasn't sure what to try. She then started having diarrhea yesterday and was brought in for further evaluation of both concerns last night. Since being in the hospital, she has taken tylenol and used a lidocaine patch with some relief. She denies any neck, arm, or leg pain, numbness, tingling, weakness, change in bowel or bladder control.   She takes calcium/vit D but no osteoporosis agents    Review of Systems   Constitutional, HEENT, cardiovascular, pulmonary, gi and gu systems are negative, except as otherwise noted.      Objective      Vitals:  No vitals were obtained today due to virtual visit.    Physical Exam   healthy, alert, and no distress  PSYCH: Alert and oriented times 3; coherent speech, normal   rate and volume, able to articulate logical thoughts, able   to abstract reason, no tangential thoughts, no hallucinations   or delusions  Her affect is normal  RESP: No cough, no audible wheezing, able to talk in full sentences  Remainder of exam unable to be completed due to telephone visits    Xray reviewed personally   IMPRESSION: Redemonstrated exaggerated kyphosis surrounding the chronic T7, T8, T11, T12, L2, and L3 vertebral body compression deformities. No acute fracture. Scattered multilevel degenerative change.         Phone call duration: 10 minutes         Again, thank you for allowing me to participate in the care of your patient.        Sincerely,        Savi Yost PA-C

## 2023-09-14 NOTE — NURSING NOTE
Ambar is a 89 year old who is being evaluated via a billable telephone visit.      What phone number would you like to be contacted at? [phone]   How would you like to obtain your AVS? Sethhararianna    Distant Location (provider location):  On-site  Phone call duration: 5 minutes

## 2024-01-16 ENCOUNTER — LAB REQUISITION (OUTPATIENT)
Dept: LAB | Facility: CLINIC | Age: 89
End: 2024-01-16
Payer: MEDICARE

## 2024-01-16 DIAGNOSIS — G47.00 INSOMNIA, UNSPECIFIED: ICD-10-CM

## 2024-01-16 DIAGNOSIS — I10 ESSENTIAL (PRIMARY) HYPERTENSION: ICD-10-CM

## 2024-01-17 LAB
BASOPHILS # BLD AUTO: 0.1 10E3/UL (ref 0–0.2)
BASOPHILS NFR BLD AUTO: 1 %
EOSINOPHIL # BLD AUTO: 0.1 10E3/UL (ref 0–0.7)
EOSINOPHIL NFR BLD AUTO: 1 %
ERYTHROCYTE [DISTWIDTH] IN BLOOD BY AUTOMATED COUNT: 14.6 % (ref 10–15)
HCT VFR BLD AUTO: 42.3 % (ref 35–47)
HGB BLD-MCNC: 13.3 G/DL (ref 11.7–15.7)
IMM GRANULOCYTES # BLD: 0 10E3/UL
IMM GRANULOCYTES NFR BLD: 0 %
LYMPHOCYTES # BLD AUTO: 1.5 10E3/UL (ref 0.8–5.3)
LYMPHOCYTES NFR BLD AUTO: 23 %
MCH RBC QN AUTO: 31.7 PG (ref 26.5–33)
MCHC RBC AUTO-ENTMCNC: 31.4 G/DL (ref 31.5–36.5)
MCV RBC AUTO: 101 FL (ref 78–100)
MONOCYTES # BLD AUTO: 0.7 10E3/UL (ref 0–1.3)
MONOCYTES NFR BLD AUTO: 12 %
NEUTROPHILS # BLD AUTO: 4 10E3/UL (ref 1.6–8.3)
NEUTROPHILS NFR BLD AUTO: 63 %
NRBC # BLD AUTO: 0 10E3/UL
NRBC BLD AUTO-RTO: 0 /100
PLATELET # BLD AUTO: 263 10E3/UL (ref 150–450)
RBC # BLD AUTO: 4.2 10E6/UL (ref 3.8–5.2)
WBC # BLD AUTO: 6.4 10E3/UL (ref 4–11)

## 2024-01-17 PROCEDURE — 84443 ASSAY THYROID STIM HORMONE: CPT | Mod: ORL | Performed by: NURSE PRACTITIONER

## 2024-01-17 PROCEDURE — 85025 COMPLETE CBC W/AUTO DIFF WBC: CPT | Mod: ORL | Performed by: NURSE PRACTITIONER

## 2024-01-17 PROCEDURE — 82607 VITAMIN B-12: CPT | Performed by: NURSE PRACTITIONER

## 2024-01-17 PROCEDURE — 36415 COLL VENOUS BLD VENIPUNCTURE: CPT | Mod: ORL | Performed by: NURSE PRACTITIONER

## 2024-01-17 PROCEDURE — 80053 COMPREHEN METABOLIC PANEL: CPT | Mod: ORL | Performed by: NURSE PRACTITIONER

## 2024-01-17 PROCEDURE — P9604 ONE-WAY ALLOW PRORATED TRIP: HCPCS | Mod: ORL | Performed by: NURSE PRACTITIONER

## 2024-01-17 PROCEDURE — 82306 VITAMIN D 25 HYDROXY: CPT | Mod: ORL | Performed by: NURSE PRACTITIONER

## 2024-01-18 LAB
ALBUMIN SERPL BCG-MCNC: 3.9 G/DL (ref 3.5–5.2)
ALP SERPL-CCNC: 116 U/L (ref 40–150)
ALT SERPL W P-5'-P-CCNC: 23 U/L (ref 0–50)
ANION GAP SERPL CALCULATED.3IONS-SCNC: 11 MMOL/L (ref 7–15)
AST SERPL W P-5'-P-CCNC: 27 U/L (ref 0–45)
BILIRUB SERPL-MCNC: 0.3 MG/DL
BUN SERPL-MCNC: 24.8 MG/DL (ref 8–23)
CALCIUM SERPL-MCNC: 9.6 MG/DL (ref 8.2–9.6)
CHLORIDE SERPL-SCNC: 100 MMOL/L (ref 98–107)
CREAT SERPL-MCNC: 0.9 MG/DL (ref 0.51–0.95)
DEPRECATED HCO3 PLAS-SCNC: 28 MMOL/L (ref 22–29)
EGFRCR SERPLBLD CKD-EPI 2021: 60 ML/MIN/1.73M2
GLUCOSE SERPL-MCNC: 101 MG/DL (ref 70–99)
POTASSIUM SERPL-SCNC: 4.5 MMOL/L (ref 3.4–5.3)
PROT SERPL-MCNC: 6.6 G/DL (ref 6.4–8.3)
SODIUM SERPL-SCNC: 139 MMOL/L (ref 135–145)
TSH SERPL DL<=0.005 MIU/L-ACNC: 1.19 UIU/ML (ref 0.3–4.2)
VIT B12 SERPL-MCNC: 555 PG/ML (ref 232–1245)
VIT D+METAB SERPL-MCNC: 33 NG/ML (ref 20–50)

## 2024-04-23 ENCOUNTER — LAB REQUISITION (OUTPATIENT)
Dept: LAB | Facility: CLINIC | Age: 89
End: 2024-04-23
Payer: MEDICARE

## 2024-04-23 DIAGNOSIS — R25.2 CRAMP AND SPASM: ICD-10-CM

## 2024-04-24 PROCEDURE — 83735 ASSAY OF MAGNESIUM: CPT | Mod: ORL | Performed by: NURSE PRACTITIONER

## 2024-04-24 PROCEDURE — 36415 COLL VENOUS BLD VENIPUNCTURE: CPT | Mod: ORL | Performed by: NURSE PRACTITIONER

## 2024-04-24 PROCEDURE — 80048 BASIC METABOLIC PNL TOTAL CA: CPT | Mod: ORL | Performed by: NURSE PRACTITIONER

## 2024-04-24 PROCEDURE — P9604 ONE-WAY ALLOW PRORATED TRIP: HCPCS | Mod: ORL | Performed by: NURSE PRACTITIONER

## 2024-04-25 LAB
ANION GAP SERPL CALCULATED.3IONS-SCNC: 12 MMOL/L (ref 7–15)
BUN SERPL-MCNC: 21 MG/DL (ref 8–23)
CALCIUM SERPL-MCNC: 9.9 MG/DL (ref 8.2–9.6)
CHLORIDE SERPL-SCNC: 101 MMOL/L (ref 98–107)
CREAT SERPL-MCNC: 0.88 MG/DL (ref 0.51–0.95)
DEPRECATED HCO3 PLAS-SCNC: 29 MMOL/L (ref 22–29)
EGFRCR SERPLBLD CKD-EPI 2021: 62 ML/MIN/1.73M2
GLUCOSE SERPL-MCNC: 83 MG/DL (ref 70–99)
MAGNESIUM SERPL-MCNC: 2 MG/DL (ref 1.7–2.3)
POTASSIUM SERPL-SCNC: 4 MMOL/L (ref 3.4–5.3)
SODIUM SERPL-SCNC: 142 MMOL/L (ref 135–145)

## 2024-10-05 ENCOUNTER — HEALTH MAINTENANCE LETTER (OUTPATIENT)
Age: 89
End: 2024-10-05

## 2025-01-10 ENCOUNTER — LAB REQUISITION (OUTPATIENT)
Dept: LAB | Facility: CLINIC | Age: OVER 89
End: 2025-01-10
Payer: COMMERCIAL

## 2025-01-10 DIAGNOSIS — R91.1 SOLITARY PULMONARY NODULE: ICD-10-CM

## 2025-01-10 DIAGNOSIS — S92.401A DISPLACED UNSPECIFIED FRACTURE OF RIGHT GREAT TOE, INITIAL ENCOUNTER FOR CLOSED FRACTURE: ICD-10-CM

## 2025-01-10 DIAGNOSIS — R26.89 OTHER ABNORMALITIES OF GAIT AND MOBILITY: ICD-10-CM

## 2025-01-10 DIAGNOSIS — J96.01 ACUTE RESPIRATORY FAILURE WITH HYPOXIA (H): ICD-10-CM

## 2025-01-10 DIAGNOSIS — G31.84 MILD COGNITIVE IMPAIRMENT OF UNCERTAIN OR UNKNOWN ETIOLOGY: ICD-10-CM

## 2025-01-15 LAB
ALBUMIN SERPL BCG-MCNC: 3.5 G/DL (ref 3.5–5.2)
ALP SERPL-CCNC: 175 U/L (ref 40–150)
ALT SERPL W P-5'-P-CCNC: 16 U/L (ref 0–50)
ANION GAP SERPL CALCULATED.3IONS-SCNC: 9 MMOL/L (ref 7–15)
AST SERPL W P-5'-P-CCNC: 26 U/L (ref 0–45)
BILIRUB SERPL-MCNC: 0.3 MG/DL
BUN SERPL-MCNC: 30.3 MG/DL (ref 8–23)
CALCIUM SERPL-MCNC: 9.7 MG/DL (ref 8.8–10.4)
CHLORIDE SERPL-SCNC: 102 MMOL/L (ref 98–107)
CREAT SERPL-MCNC: 0.8 MG/DL (ref 0.51–0.95)
EGFRCR SERPLBLD CKD-EPI 2021: 69 ML/MIN/1.73M2
ERYTHROCYTE [DISTWIDTH] IN BLOOD BY AUTOMATED COUNT: 13.3 % (ref 10–15)
GLUCOSE SERPL-MCNC: 119 MG/DL (ref 70–99)
HCO3 SERPL-SCNC: 28 MMOL/L (ref 22–29)
HCT VFR BLD AUTO: 39.6 % (ref 35–47)
HGB BLD-MCNC: 12.8 G/DL (ref 11.7–15.7)
MCH RBC QN AUTO: 31 PG (ref 26.5–33)
MCHC RBC AUTO-ENTMCNC: 32.3 G/DL (ref 31.5–36.5)
MCV RBC AUTO: 96 FL (ref 78–100)
PLATELET # BLD AUTO: 355 10E3/UL (ref 150–450)
POTASSIUM SERPL-SCNC: 4.7 MMOL/L (ref 3.4–5.3)
PROT SERPL-MCNC: 6.9 G/DL (ref 6.4–8.3)
RBC # BLD AUTO: 4.13 10E6/UL (ref 3.8–5.2)
SODIUM SERPL-SCNC: 139 MMOL/L (ref 135–145)
TSH SERPL DL<=0.005 MIU/L-ACNC: 1.33 UIU/ML (ref 0.3–4.2)
WBC # BLD AUTO: 9 10E3/UL (ref 4–11)

## 2025-01-15 PROCEDURE — P9604 ONE-WAY ALLOW PRORATED TRIP: HCPCS | Mod: ORL | Performed by: NURSE PRACTITIONER

## 2025-01-15 PROCEDURE — 36415 COLL VENOUS BLD VENIPUNCTURE: CPT | Mod: ORL | Performed by: NURSE PRACTITIONER

## 2025-01-15 PROCEDURE — 85027 COMPLETE CBC AUTOMATED: CPT | Mod: ORL | Performed by: NURSE PRACTITIONER

## 2025-01-15 PROCEDURE — 80053 COMPREHEN METABOLIC PANEL: CPT | Mod: ORL | Performed by: NURSE PRACTITIONER

## 2025-01-15 PROCEDURE — 84443 ASSAY THYROID STIM HORMONE: CPT | Mod: ORL | Performed by: NURSE PRACTITIONER
